# Patient Record
Sex: MALE | Race: WHITE | Employment: OTHER | URBAN - METROPOLITAN AREA
[De-identification: names, ages, dates, MRNs, and addresses within clinical notes are randomized per-mention and may not be internally consistent; named-entity substitution may affect disease eponyms.]

---

## 2022-08-16 ENCOUNTER — HOSPITAL ENCOUNTER (OUTPATIENT)
Dept: OCCUPATIONAL THERAPY | Age: 67
Setting detail: THERAPIES SERIES
Discharge: HOME | End: 2022-08-16
Payer: MEDICARE

## 2022-08-16 PROCEDURE — 97165 OT EVAL LOW COMPLEX 30 MIN: CPT

## 2022-08-16 PROCEDURE — 97537 COMMUNITY/WORK REINTEGRATION: CPT
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
## 2022-08-16 NOTE — PROGRESS NOTES
8/16/2022    Dear Dr. Vielka Cunningham (MR# [id number]) was seen by Occupational Therapy on 8/16/2022 for a s Evaluation at [facility].  As you know, Mr. Yuliet Salas suffers from dementia. He wants to maintain driving to be independent in community mobility and leisure skills. Mr. Yuliet Salas passed tests for visual acuity, saccades, pursuits, depth perception, peripheral vision, color vision, and traffic signs and signals. He was administered the Trails Making Tests Part A and B which are cognitive tests that involve scanning, speed of mental processing, visual motor sequencing, as well as switching cognitive sets. On Part A, he scored below normal limits with 61.60 seconds over a norm of 60 seconds and on Part B, he was stopped at 300 seconds with a norm of 180 seconds. He was administered the Clock Drawing Test which measured short-term memory, visual perception, visuospatial skills, selective attention, and executive skills. He had 0 errors which is within normal limits. He demonstrated functional physical capacity for driving. Behind the wheel, Mr. Yuliet Salas was able to manipulate all vehicle controls. He drove on secondary and primary roads in light to moderately heavy traffic. He demonstrated the ability to park, back up, maintain speed and traffic flow, change lanes and follow directions. Responses to situations encountered were appropriate. His wife reports he has made wrongs turns when driving alone and has gotten lost.  Pt reports he has stopped to get directions when lost.  His wife has installed a tracking neha on his phone and also has a GPS tracker on his keys. Based on reports of getting lost when driving and also decrease short term memory it was recommended he drive on local, familiar roads within four miles of his home with no expressway driving.     Based on the results of this evaluation, it appears that Mr. Yuliet Salas is a suitable candidate to maintain driving on local, familiar roads within four miles of his home with no expressway driving. He is as safe as the driving public. The final decision remains with the physician. Please inform Mr. Josseline Hernandez of your decision. I can be reached at [phone] if questions arise.   Thank you for this referral.    Sincerely,      Luis Brewster, [address] [facility], [facility]   Certified  Rehabilitation Specialist

## 2022-08-16 NOTE — PROGRESS NOTES
Outpatient Occupational Therapy  [] [facility] [address] ARELY BANUELOS   Phone: [phone]   Fax: [phone]   [x] [facility]  Phone: [phone]   Fax: [phone]  [] [place]   Phone: [phone]   Fax: [phone]      To:  Dr. Shaji Nichols      Patient: Axel Paul  : 1955  MRN: [id number]  Evaluation Date: 2022      Diagnosis Information: Dementia            Occupational Therapy Certification/Re-Certification Form  Dear Dr. Shaji Nichols,   The following patient has been evaluated for occupational therapy services and for therapy to continue, Medicare requires monthly physician review of the treatment plan. Please review the attached evaluation and/or summary of the patient's plan of care, and verify that you agree therapy should continue by signing the attached document and sending it back to our office. Plan of Care/Treatment to date:  [] Therapeutic Exercise   [] Modalities:  [] Therapeutic Activity    [] Ultrasound  [] Electrical Stimulation   [] Activities of Daily Living    [] Fluidotherapy [] Kinesiotaping  [] Neuromuscular Re-education   [] Iontophoresis [] Coldpack/hotpack   [] Instruction in HEP     [] Contrast Bath  [] Manual Therapy     Other:  [] Aquatic Therapy      [x] Maintain driving on local, familiar roads within four miles                                                                                     of his home with no expressway driving. Frequency/Duration:  # Days per week: [x] 1 day # Weeks: [x] 1 week [] 5 weeks      [] 2 days   [] 2 weeks [] 6 weeks     [] 3 days   [] 3 weeks [] 7 weeks     [] 4 days   [] 4 weeks [] 8 weeks    Rehab Potential: [] excellent [x] good [] fair  [] poor       Electronically signed by:  Harpreet Abreu, TOD, LDI      If you have any questions or concerns, please don't hesitate to call.   Thank you for your referral.      Physician Signature:________________________________Date:__________________  By signing above, therapists plan is approved by physician

## 2023-11-20 ENCOUNTER — HOSPITAL ENCOUNTER (OUTPATIENT)
Dept: OCCUPATIONAL THERAPY | Age: 68
Setting detail: THERAPIES SERIES
Discharge: HOME | End: 2023-11-20
Payer: MEDICARE

## 2023-11-20 PROCEDURE — 97537 COMMUNITY/WORK REINTEGRATION: CPT

## 2023-11-20 PROCEDURE — 97165 OT EVAL LOW COMPLEX 30 MIN: CPT

## 2024-07-18 ENCOUNTER — APPOINTMENT (OUTPATIENT)
Dept: GENERAL RADIOLOGY | Age: 69
End: 2024-07-18
Payer: MEDICARE

## 2024-07-18 ENCOUNTER — HOSPITAL ENCOUNTER (EMERGENCY)
Age: 69
Discharge: HOME | End: 2024-07-18
Payer: MEDICARE

## 2024-07-18 ENCOUNTER — APPOINTMENT (OUTPATIENT)
Dept: CT IMAGING | Age: 69
End: 2024-07-18
Payer: MEDICARE

## 2024-07-18 VITALS
WEIGHT: 220.46 LBS | HEART RATE: 81 BPM | TEMPERATURE: 98.6 F | DIASTOLIC BLOOD PRESSURE: 78 MMHG | SYSTOLIC BLOOD PRESSURE: 138 MMHG | RESPIRATION RATE: 18 BRPM | OXYGEN SATURATION: 100 %

## 2024-07-18 DIAGNOSIS — N39.0 URINARY TRACT INFECTION WITH HEMATURIA, SITE UNSPECIFIED: Primary | ICD-10-CM

## 2024-07-18 DIAGNOSIS — F02.80 ALZHEIMER DISEASE (HCC): ICD-10-CM

## 2024-07-18 DIAGNOSIS — G30.9 ALZHEIMER DISEASE (HCC): ICD-10-CM

## 2024-07-18 DIAGNOSIS — R31.9 URINARY TRACT INFECTION WITH HEMATURIA, SITE UNSPECIFIED: Primary | ICD-10-CM

## 2024-07-18 PROBLEM — G47.33 OSA (OBSTRUCTIVE SLEEP APNEA): Status: ACTIVE | Noted: 2022-10-04

## 2024-07-18 PROBLEM — E03.8 SUBCLINICAL HYPOTHYROIDISM: Status: ACTIVE | Noted: 2023-03-20

## 2024-07-18 PROBLEM — R73.03 PREDIABETES: Chronic | Status: ACTIVE | Noted: 2022-10-25

## 2024-07-18 PROBLEM — F33.0 MILD EPISODE OF RECURRENT MAJOR DEPRESSIVE DISORDER (HCC): Status: ACTIVE | Noted: 2022-06-28

## 2024-07-18 PROBLEM — E78.1 HYPERTRIGLYCERIDEMIA: Chronic | Status: ACTIVE | Noted: 2022-10-25

## 2024-07-18 PROBLEM — G25.2 POSTURAL TREMOR: Status: ACTIVE | Noted: 2022-06-28

## 2024-07-18 PROBLEM — E55.9 VITAMIN D DEFICIENCY: Status: ACTIVE | Noted: 2017-09-27

## 2024-07-18 PROBLEM — E53.8 B12 DEFICIENCY: Status: ACTIVE | Noted: 2024-04-02

## 2024-07-18 PROBLEM — R41.3 POOR SHORT-TERM MEMORY: Status: ACTIVE | Noted: 2017-10-22

## 2024-07-18 PROBLEM — R97.20 ELEVATED PSA, LESS THAN 10 NG/ML: Status: ACTIVE | Noted: 2023-04-07

## 2024-07-18 LAB
ALBUMIN SERPL-MCNC: 4.8 G/DL (ref 3.4–5)
ALBUMIN/GLOB SERPL: 1.6 {RATIO} (ref 1.1–2.2)
ALP SERPL-CCNC: 63 U/L (ref 40–129)
ALT SERPL-CCNC: 18 U/L (ref 10–40)
AMMONIA PLAS-SCNC: 29 UMOL/L (ref 16–60)
AMPHETAMINES UR QL SCN>1000 NG/ML: NORMAL
ANION GAP SERPL CALCULATED.3IONS-SCNC: 13 MMOL/L (ref 3–16)
AST SERPL-CCNC: 21 U/L (ref 15–37)
BACTERIA URNS QL MICRO: ABNORMAL /HPF
BARBITURATES UR QL SCN>200 NG/ML: NORMAL
BASOPHILS # BLD: 0.1 K/UL (ref 0–0.2)
BASOPHILS NFR BLD: 0.4 %
BENZODIAZ UR QL SCN>200 NG/ML: NORMAL
BILIRUB SERPL-MCNC: 0.3 MG/DL (ref 0–1)
BILIRUB UR QL STRIP.AUTO: ABNORMAL
BUN SERPL-MCNC: 18 MG/DL (ref 7–20)
CALCIUM OXALATE CRYSTALS: PRESENT
CALCIUM SERPL-MCNC: 9.8 MG/DL (ref 8.3–10.6)
CANNABINOIDS UR QL SCN>50 NG/ML: NORMAL
CHLORIDE SERPL-SCNC: 105 MMOL/L (ref 99–110)
CLARITY UR: ABNORMAL
CO2 SERPL-SCNC: 23 MMOL/L (ref 21–32)
COCAINE UR QL SCN: NORMAL
COLOR UR: ABNORMAL
CREAT SERPL-MCNC: 1.6 MG/DL (ref 0.8–1.3)
DEPRECATED RDW RBC AUTO: 14 % (ref 12.4–15.4)
DRUG SCREEN COMMENT UR-IMP: NORMAL
EOSINOPHIL # BLD: 0.1 K/UL (ref 0–0.6)
EOSINOPHIL NFR BLD: 0.8 %
EPI CELLS #/AREA URNS AUTO: 5 /HPF (ref 0–5)
ETHANOLAMINE SERPL-MCNC: NORMAL MG/DL (ref 0–0.08)
ETHANOLAMINE SERPL-MCNC: NORMAL MG/DL (ref 0–0.08)
FENTANYL SCREEN, URINE: NORMAL
GFR SERPLBLD CREATININE-BSD FMLA CKD-EPI: 46 ML/MIN/{1.73_M2}
GLUCOSE SERPL-MCNC: 162 MG/DL (ref 70–99)
GLUCOSE UR STRIP.AUTO-MCNC: 100 MG/DL
HCT VFR BLD AUTO: 44.8 % (ref 40.5–52.5)
HGB BLD-MCNC: 15.1 G/DL (ref 13.5–17.5)
HGB UR QL STRIP.AUTO: ABNORMAL
HYALINE CASTS #/AREA URNS AUTO: 31 /LPF (ref 0–8)
HYALINE CASTS: PRESENT
KETONES UR STRIP.AUTO-MCNC: ABNORMAL MG/DL
LEUKOCYTE ESTERASE UR QL STRIP.AUTO: NEGATIVE
LYMPHOCYTES # BLD: 2.2 K/UL (ref 1–5.1)
LYMPHOCYTES NFR BLD: 14.2 %
MCH RBC QN AUTO: 29.9 PG (ref 26–34)
MCHC RBC AUTO-ENTMCNC: 33.6 G/DL (ref 31–36)
MCV RBC AUTO: 88.9 FL (ref 80–100)
METHADONE UR QL SCN>300 NG/ML: NORMAL
MONOCYTES # BLD: 0.8 K/UL (ref 0–1.3)
MONOCYTES NFR BLD: 4.8 %
NEUTROPHILS # BLD: 12.6 K/UL (ref 1.7–7.7)
NEUTROPHILS NFR BLD: 79.8 %
NITRITE UR QL STRIP.AUTO: NEGATIVE
OPIATES UR QL SCN>300 NG/ML: NORMAL
OXYCODONE UR QL SCN: NORMAL
PCP UR QL SCN>25 NG/ML: NORMAL
PH UR STRIP.AUTO: 5.5 [PH] (ref 5–8)
PH UR STRIP: 5 [PH]
PLATELET # BLD AUTO: 271 K/UL (ref 135–450)
PMV BLD AUTO: 8.9 FL (ref 5–10.5)
POTASSIUM SERPL-SCNC: 4.1 MMOL/L (ref 3.5–5.1)
PROT SERPL-MCNC: 7.8 G/DL (ref 6.4–8.2)
PROT UR STRIP.AUTO-MCNC: 300 MG/DL
RBC # BLD AUTO: 5.04 M/UL (ref 4.2–5.9)
RBC CLUMPS #/AREA URNS AUTO: 43 /HPF (ref 0–4)
SODIUM SERPL-SCNC: 141 MMOL/L (ref 136–145)
SP GR UR STRIP.AUTO: 1.03 (ref 1–1.03)
TSH SERPL DL<=0.005 MIU/L-ACNC: 5.2 UIU/ML (ref 0.27–4.2)
UA COMPLETE W REFLEX CULTURE PNL UR: YES
UA DIPSTICK W REFLEX MICRO PNL UR: YES
URN SPEC COLLECT METH UR: ABNORMAL
UROBILINOGEN UR STRIP-ACNC: 1 E.U./DL
WBC # BLD AUTO: 15.7 K/UL (ref 4–11)
WBC #/AREA URNS AUTO: 11 /HPF (ref 0–5)

## 2024-07-18 PROCEDURE — 81001 URINALYSIS AUTO W/SCOPE: CPT

## 2024-07-18 PROCEDURE — 80307 DRUG TEST PRSMV CHEM ANLYZR: CPT

## 2024-07-18 PROCEDURE — 85025 COMPLETE CBC W/AUTO DIFF WBC: CPT

## 2024-07-18 PROCEDURE — 93005 ELECTROCARDIOGRAM TRACING: CPT | Performed by: STUDENT IN AN ORGANIZED HEALTH CARE EDUCATION/TRAINING PROGRAM

## 2024-07-18 PROCEDURE — 84443 ASSAY THYROID STIM HORMONE: CPT

## 2024-07-18 PROCEDURE — 99285 EMERGENCY DEPT VISIT HI MDM: CPT

## 2024-07-18 PROCEDURE — 87086 URINE CULTURE/COLONY COUNT: CPT

## 2024-07-18 PROCEDURE — 82077 ASSAY SPEC XCP UR&BREATH IA: CPT

## 2024-07-18 PROCEDURE — 71046 X-RAY EXAM CHEST 2 VIEWS: CPT

## 2024-07-18 PROCEDURE — 6370000000 HC RX 637 (ALT 250 FOR IP): Performed by: PHYSICIAN ASSISTANT

## 2024-07-18 PROCEDURE — 80053 COMPREHEN METABOLIC PANEL: CPT

## 2024-07-18 PROCEDURE — 70450 CT HEAD/BRAIN W/O DYE: CPT

## 2024-07-18 PROCEDURE — 82140 ASSAY OF AMMONIA: CPT

## 2024-07-18 RX ORDER — CEPHALEXIN 500 MG/1
500 CAPSULE ORAL 2 TIMES DAILY
Qty: 14 CAPSULE | Refills: 0 | Status: SHIPPED | OUTPATIENT
Start: 2024-07-18 | End: 2024-07-25

## 2024-07-18 RX ORDER — CEPHALEXIN 500 MG/1
500 CAPSULE ORAL ONCE
Status: COMPLETED | OUTPATIENT
Start: 2024-07-18 | End: 2024-07-18

## 2024-07-18 RX ADMIN — CEPHALEXIN 500 MG: 500 CAPSULE ORAL at 20:47

## 2024-07-18 ASSESSMENT — LIFESTYLE VARIABLES
HOW OFTEN DO YOU HAVE A DRINK CONTAINING ALCOHOL: NEVER
HOW MANY STANDARD DRINKS CONTAINING ALCOHOL DO YOU HAVE ON A TYPICAL DAY: PATIENT DOES NOT DRINK

## 2024-07-18 ASSESSMENT — PAIN - FUNCTIONAL ASSESSMENT: PAIN_FUNCTIONAL_ASSESSMENT: NONE - DENIES PAIN

## 2024-07-18 NOTE — ED PROVIDER NOTES
[facility] EMERGENCY DEPARTMENT  EMERGENCY DEPARTMENT ENCOUNTER        Pt Name: Spencer Ingram  MRN: [id number]  Birthdate 1955  Date of evaluation: 7/18/2024  Provider: DEANNA Lyon  PCP: Doroteo Jha MD  Note Started: 5:51 PM EDT 7/18/24      CHON. I have evaluated this patient.        CHIEF COMPLAINT       Chief Complaint   Patient presents with    Altered Mental Status     Pt to ED via [facility] c/o AMS. Per EMS \"Pt was found wondering in the woods and was found altered\" Pt is A&O x2       HISTORY OF PRESENT ILLNESS: 1 or more Elements     History from : Patient and EMS    Limitations to history : None    Spencer Ingram is a 68 y.o. male who presents to the emergency room due to altered mental status as reported by EMS.  EMS stated that they noticed the patient wandering around in the woods and was found to be altered.  Review of patient's chart showed that he does have Alzheimer's dementia.  EMS also state that they have encountered this patient before wandering outside similar presentation.  He also states that he had vomit on his shirt when he first saw him.  Patient is alert to self and to where he is at the hospital and knows what year it is.  He does not remember throwing up on himself.  He states that he was walking up to a field to go to work.  He denies any pain, headache, vision changes, chest pain, shortness of breath or difficulty breathing.  Nursing staff state that they tried to call family members that were listed in his chart but they did not answer yet.    Nursing Notes were all reviewed and agreed with or any disagreements were addressed in the HPI.    REVIEW OF SYSTEMS :      Review of Systems   Unable to perform ROS: Dementia       Positives and Pertinent negatives as per HPI.     SURGICAL HISTORY   No past surgical history on file.    CURRENTMEDICATIONS       Discharge Medication List as of 7/18/2024  8:29 PM          ALLERGIES     Patient has no known

## 2024-07-19 LAB
BACTERIA UR CULT: NORMAL
EKG ATRIAL RATE: 87 BPM
EKG DIAGNOSIS: NORMAL
EKG P AXIS: 48 DEGREES
EKG P-R INTERVAL: 186 MS
EKG Q-T INTERVAL: 342 MS
EKG QRS DURATION: 92 MS
EKG QTC CALCULATION (BAZETT): 411 MS
EKG R AXIS: 16 DEGREES
EKG T AXIS: 31 DEGREES
EKG VENTRICULAR RATE: 87 BPM

## 2024-07-19 PROCEDURE — 93010 ELECTROCARDIOGRAM REPORT: CPT | Performed by: INTERNAL MEDICINE

## 2024-07-19 NOTE — ED PROVIDER NOTES
I did not perform an evaluation of Spencer Ingram but was asked to review his EKG.     EKG interpreted by myself.   Rate: normal  Rhythm: NSR  Axis: normal  Intervals: within normal limits  ST Segments: no acute abnormality  T waves: no acute abnormality  Comparison: No prior EKG on file.  Impression: NSR with no acute abnormality         Riley Marquez MD  07/18/24 2052

## 2024-07-19 NOTE — ED NOTES

## 2025-03-22 ENCOUNTER — HOSPITAL ENCOUNTER (OUTPATIENT)
Age: 70
Setting detail: OBSERVATION
Discharge: PSYCHIATRIC OR SUBSTANCE USE FACILITY | End: 2025-03-24
Attending: EMERGENCY MEDICINE | Admitting: INTERNAL MEDICINE
Payer: MEDICARE

## 2025-03-22 DIAGNOSIS — R41.82 ALTERED MENTAL STATUS, UNSPECIFIED ALTERED MENTAL STATUS TYPE: Primary | ICD-10-CM

## 2025-03-22 PROCEDURE — 99285 EMERGENCY DEPT VISIT HI MDM: CPT

## 2025-03-22 ASSESSMENT — PAIN - FUNCTIONAL ASSESSMENT: PAIN_FUNCTIONAL_ASSESSMENT: NONE - DENIES PAIN

## 2025-03-23 ENCOUNTER — APPOINTMENT (OUTPATIENT)
Dept: CT IMAGING | Age: 70
End: 2025-03-23
Payer: MEDICARE

## 2025-03-23 PROBLEM — F03.918 DEMENTIA WITH BEHAVIORAL DISTURBANCE (HCC): Status: ACTIVE | Noted: 2025-03-23

## 2025-03-23 LAB
ALBUMIN SERPL-MCNC: 4.1 G/DL (ref 3.4–5)
ALBUMIN/GLOB SERPL: 1.7 {RATIO} (ref 1.1–2.2)
ALP SERPL-CCNC: 52 U/L (ref 40–129)
ALT SERPL-CCNC: 13 U/L (ref 10–40)
AMPHETAMINES UR QL SCN>1000 NG/ML: NORMAL
ANION GAP SERPL CALCULATED.3IONS-SCNC: 9 MMOL/L (ref 3–16)
APAP SERPL-MCNC: <5 UG/ML (ref 10–30)
AST SERPL-CCNC: 18 U/L (ref 15–37)
BARBITURATES UR QL SCN>200 NG/ML: NORMAL
BASOPHILS # BLD: 0 K/UL (ref 0–0.2)
BASOPHILS NFR BLD: 0.4 %
BENZODIAZ UR QL SCN>200 NG/ML: NORMAL
BILIRUB SERPL-MCNC: 0.3 MG/DL (ref 0–1)
BILIRUB UR QL STRIP.AUTO: NEGATIVE
BUN SERPL-MCNC: 15 MG/DL (ref 7–20)
CALCIUM SERPL-MCNC: 9.2 MG/DL (ref 8.3–10.6)
CANNABINOIDS UR QL SCN>50 NG/ML: NORMAL
CHLORIDE SERPL-SCNC: 106 MMOL/L (ref 99–110)
CLARITY UR: CLEAR
CO2 SERPL-SCNC: 24 MMOL/L (ref 21–32)
COCAINE UR QL SCN: NORMAL
COLOR UR: YELLOW
CREAT SERPL-MCNC: 1 MG/DL (ref 0.8–1.3)
DEPRECATED RDW RBC AUTO: 14 % (ref 12.4–15.4)
DRUG SCREEN COMMENT UR-IMP: NORMAL
EOSINOPHIL # BLD: 0.2 K/UL (ref 0–0.6)
EOSINOPHIL NFR BLD: 1.9 %
ETHANOLAMINE SERPL-MCNC: NORMAL MG/DL (ref 0–0.08)
FENTANYL SCREEN, URINE: NORMAL
FOLATE SERPL-MCNC: 10.1 NG/ML (ref 4.78–24.2)
GFR SERPLBLD CREATININE-BSD FMLA CKD-EPI: 81 ML/MIN/{1.73_M2}
GLUCOSE SERPL-MCNC: 99 MG/DL (ref 70–99)
GLUCOSE UR STRIP.AUTO-MCNC: NEGATIVE MG/DL
HCT VFR BLD AUTO: 39.4 % (ref 40.5–52.5)
HGB BLD-MCNC: 13.1 G/DL (ref 13.5–17.5)
HGB UR QL STRIP.AUTO: NEGATIVE
KETONES UR STRIP.AUTO-MCNC: NEGATIVE MG/DL
LEUKOCYTE ESTERASE UR QL STRIP.AUTO: NEGATIVE
LYMPHOCYTES # BLD: 2.7 K/UL (ref 1–5.1)
LYMPHOCYTES NFR BLD: 34.2 %
MCH RBC QN AUTO: 29.7 PG (ref 26–34)
MCHC RBC AUTO-ENTMCNC: 33.3 G/DL (ref 31–36)
MCV RBC AUTO: 89 FL (ref 80–100)
METHADONE UR QL SCN>300 NG/ML: NORMAL
MONOCYTES # BLD: 0.6 K/UL (ref 0–1.3)
MONOCYTES NFR BLD: 7 %
NEUTROPHILS # BLD: 4.5 K/UL (ref 1.7–7.7)
NEUTROPHILS NFR BLD: 56.5 %
NITRITE UR QL STRIP.AUTO: NEGATIVE
NT-PROBNP SERPL-MCNC: 202 PG/ML (ref 0–124)
OPIATES UR QL SCN>300 NG/ML: NORMAL
OXYCODONE UR QL SCN: NORMAL
PCP UR QL SCN>25 NG/ML: NORMAL
PH UR STRIP.AUTO: 6.5 [PH] (ref 5–8)
PH UR STRIP: 5.5 [PH]
PLATELET # BLD AUTO: 203 K/UL (ref 135–450)
PMV BLD AUTO: 9 FL (ref 5–10.5)
POTASSIUM SERPL-SCNC: 3.6 MMOL/L (ref 3.5–5.1)
PROT SERPL-MCNC: 6.5 G/DL (ref 6.4–8.2)
PROT UR STRIP.AUTO-MCNC: NEGATIVE MG/DL
RBC # BLD AUTO: 4.42 M/UL (ref 4.2–5.9)
SALICYLATES SERPL-MCNC: <0.5 MG/DL (ref 15–30)
SODIUM SERPL-SCNC: 139 MMOL/L (ref 136–145)
SP GR UR STRIP.AUTO: 1.02 (ref 1–1.03)
TROPONIN, HIGH SENSITIVITY: 9 NG/L (ref 0–22)
TSH SERPL DL<=0.005 MIU/L-ACNC: 3.09 UIU/ML (ref 0.27–4.2)
UA COMPLETE W REFLEX CULTURE PNL UR: NORMAL
UA DIPSTICK W REFLEX MICRO PNL UR: NORMAL
URN SPEC COLLECT METH UR: NORMAL
UROBILINOGEN UR STRIP-ACNC: 1 E.U./DL
VIT B12 SERPL-MCNC: 745 PG/ML (ref 211–911)
WBC # BLD AUTO: 7.9 K/UL (ref 4–11)

## 2025-03-23 PROCEDURE — 6370000000 HC RX 637 (ALT 250 FOR IP): Performed by: FAMILY MEDICINE

## 2025-03-23 PROCEDURE — 84484 ASSAY OF TROPONIN QUANT: CPT

## 2025-03-23 PROCEDURE — 2500000003 HC RX 250 WO HCPCS

## 2025-03-23 PROCEDURE — 80143 DRUG ASSAY ACETAMINOPHEN: CPT

## 2025-03-23 PROCEDURE — P9612 CATHETERIZE FOR URINE SPEC: HCPCS

## 2025-03-23 PROCEDURE — 2500000003 HC RX 250 WO HCPCS: Performed by: INTERNAL MEDICINE

## 2025-03-23 PROCEDURE — 6360000002 HC RX W HCPCS: Performed by: FAMILY MEDICINE

## 2025-03-23 PROCEDURE — 80179 DRUG ASSAY SALICYLATE: CPT

## 2025-03-23 PROCEDURE — 84443 ASSAY THYROID STIM HORMONE: CPT

## 2025-03-23 PROCEDURE — 94760 N-INVAS EAR/PLS OXIMETRY 1: CPT

## 2025-03-23 PROCEDURE — 82607 VITAMIN B-12: CPT

## 2025-03-23 PROCEDURE — 82746 ASSAY OF FOLIC ACID SERUM: CPT

## 2025-03-23 PROCEDURE — 6360000002 HC RX W HCPCS: Performed by: INTERNAL MEDICINE

## 2025-03-23 PROCEDURE — 85025 COMPLETE CBC W/AUTO DIFF WBC: CPT

## 2025-03-23 PROCEDURE — 96372 THER/PROPH/DIAG INJ SC/IM: CPT

## 2025-03-23 PROCEDURE — 80307 DRUG TEST PRSMV CHEM ANLYZR: CPT

## 2025-03-23 PROCEDURE — 80053 COMPREHEN METABOLIC PANEL: CPT

## 2025-03-23 PROCEDURE — G0378 HOSPITAL OBSERVATION PER HR: HCPCS

## 2025-03-23 PROCEDURE — 6370000000 HC RX 637 (ALT 250 FOR IP): Performed by: INTERNAL MEDICINE

## 2025-03-23 PROCEDURE — 81003 URINALYSIS AUTO W/O SCOPE: CPT

## 2025-03-23 PROCEDURE — 87635 SARS-COV-2 COVID-19 AMP PRB: CPT

## 2025-03-23 PROCEDURE — 82077 ASSAY SPEC XCP UR&BREATH IA: CPT

## 2025-03-23 PROCEDURE — 96374 THER/PROPH/DIAG INJ IV PUSH: CPT

## 2025-03-23 PROCEDURE — 83880 ASSAY OF NATRIURETIC PEPTIDE: CPT

## 2025-03-23 PROCEDURE — 6370000000 HC RX 637 (ALT 250 FOR IP): Performed by: PHYSICIAN ASSISTANT

## 2025-03-23 PROCEDURE — 36415 COLL VENOUS BLD VENIPUNCTURE: CPT

## 2025-03-23 PROCEDURE — 70450 CT HEAD/BRAIN W/O DYE: CPT

## 2025-03-23 RX ORDER — DIPHENHYDRAMINE HYDROCHLORIDE 50 MG/ML
25 INJECTION, SOLUTION INTRAMUSCULAR; INTRAVENOUS EVERY 6 HOURS PRN
Status: DISCONTINUED | OUTPATIENT
Start: 2025-03-23 | End: 2025-03-24 | Stop reason: HOSPADM

## 2025-03-23 RX ORDER — ACETAMINOPHEN 325 MG/1
650 TABLET ORAL EVERY 6 HOURS PRN
Status: DISCONTINUED | OUTPATIENT
Start: 2025-03-23 | End: 2025-03-24 | Stop reason: HOSPADM

## 2025-03-23 RX ORDER — ATORVASTATIN CALCIUM 20 MG/1
20 TABLET, FILM COATED ORAL DAILY
Status: ON HOLD | COMMUNITY
Start: 2023-10-18

## 2025-03-23 RX ORDER — WATER 10 ML/10ML
INJECTION INTRAMUSCULAR; INTRAVENOUS; SUBCUTANEOUS
Status: COMPLETED
Start: 2025-03-23 | End: 2025-03-23

## 2025-03-23 RX ORDER — ONDANSETRON 2 MG/ML
4 INJECTION INTRAMUSCULAR; INTRAVENOUS EVERY 6 HOURS PRN
Status: DISCONTINUED | OUTPATIENT
Start: 2025-03-23 | End: 2025-03-24 | Stop reason: HOSPADM

## 2025-03-23 RX ORDER — DONEPEZIL HYDROCHLORIDE 10 MG/1
10 TABLET, FILM COATED ORAL
Status: DISCONTINUED | OUTPATIENT
Start: 2025-03-24 | End: 2025-03-24 | Stop reason: HOSPADM

## 2025-03-23 RX ORDER — QUETIAPINE FUMARATE 25 MG/1
50 TABLET, FILM COATED ORAL 2 TIMES DAILY
Status: DISCONTINUED | OUTPATIENT
Start: 2025-03-23 | End: 2025-03-24 | Stop reason: HOSPADM

## 2025-03-23 RX ORDER — ATORVASTATIN CALCIUM 20 MG/1
20 TABLET, FILM COATED ORAL DAILY
Status: DISCONTINUED | OUTPATIENT
Start: 2025-03-23 | End: 2025-03-24 | Stop reason: HOSPADM

## 2025-03-23 RX ORDER — HALOPERIDOL 5 MG/ML
5 INJECTION INTRAMUSCULAR EVERY 6 HOURS PRN
Status: DISCONTINUED | OUTPATIENT
Start: 2025-03-23 | End: 2025-03-24 | Stop reason: HOSPADM

## 2025-03-23 RX ORDER — LORAZEPAM 2 MG/ML
2 INJECTION INTRAMUSCULAR ONCE
Status: COMPLETED | OUTPATIENT
Start: 2025-03-23 | End: 2025-03-23

## 2025-03-23 RX ORDER — POLYETHYLENE GLYCOL 3350 17 G/17G
17 POWDER, FOR SOLUTION ORAL DAILY PRN
Status: DISCONTINUED | OUTPATIENT
Start: 2025-03-23 | End: 2025-03-24 | Stop reason: HOSPADM

## 2025-03-23 RX ORDER — ACETAMINOPHEN 650 MG/1
650 SUPPOSITORY RECTAL EVERY 6 HOURS PRN
Status: DISCONTINUED | OUTPATIENT
Start: 2025-03-23 | End: 2025-03-24 | Stop reason: HOSPADM

## 2025-03-23 RX ORDER — MAGNESIUM HYDROXIDE/ALUMINUM HYDROXICE/SIMETHICONE 120; 1200; 1200 MG/30ML; MG/30ML; MG/30ML
30 SUSPENSION ORAL EVERY 6 HOURS PRN
Status: DISCONTINUED | OUTPATIENT
Start: 2025-03-23 | End: 2025-03-24 | Stop reason: HOSPADM

## 2025-03-23 RX ORDER — ONDANSETRON 4 MG/1
4 TABLET, ORALLY DISINTEGRATING ORAL EVERY 8 HOURS PRN
Status: DISCONTINUED | OUTPATIENT
Start: 2025-03-23 | End: 2025-03-24 | Stop reason: HOSPADM

## 2025-03-23 RX ORDER — ENOXAPARIN SODIUM 100 MG/ML
40 INJECTION SUBCUTANEOUS DAILY
Status: DISCONTINUED | OUTPATIENT
Start: 2025-03-23 | End: 2025-03-24 | Stop reason: HOSPADM

## 2025-03-23 RX ORDER — OLANZAPINE 10 MG/2ML
5 INJECTION, POWDER, FOR SOLUTION INTRAMUSCULAR ONCE
Status: COMPLETED | OUTPATIENT
Start: 2025-03-23 | End: 2025-03-23

## 2025-03-23 RX ORDER — MAGNESIUM SULFATE IN WATER 40 MG/ML
2000 INJECTION, SOLUTION INTRAVENOUS PRN
Status: DISCONTINUED | OUTPATIENT
Start: 2025-03-23 | End: 2025-03-24 | Stop reason: HOSPADM

## 2025-03-23 RX ORDER — DONEPEZIL HYDROCHLORIDE 10 MG/1
10 TABLET, FILM COATED ORAL
Status: ON HOLD | COMMUNITY
Start: 2023-10-23

## 2025-03-23 RX ORDER — OLANZAPINE 5 MG/1
10 TABLET, ORALLY DISINTEGRATING ORAL ONCE
Status: COMPLETED | OUTPATIENT
Start: 2025-03-23 | End: 2025-03-23

## 2025-03-23 RX ORDER — SODIUM CHLORIDE 0.9 % (FLUSH) 0.9 %
5-40 SYRINGE (ML) INJECTION PRN
Status: DISCONTINUED | OUTPATIENT
Start: 2025-03-23 | End: 2025-03-24 | Stop reason: HOSPADM

## 2025-03-23 RX ORDER — LORAZEPAM 2 MG/ML
1 INJECTION INTRAMUSCULAR EVERY 6 HOURS PRN
Status: DISCONTINUED | OUTPATIENT
Start: 2025-03-23 | End: 2025-03-24 | Stop reason: HOSPADM

## 2025-03-23 RX ORDER — SODIUM CHLORIDE 9 MG/ML
INJECTION, SOLUTION INTRAVENOUS PRN
Status: DISCONTINUED | OUTPATIENT
Start: 2025-03-23 | End: 2025-03-24 | Stop reason: HOSPADM

## 2025-03-23 RX ORDER — OLANZAPINE 10 MG/2ML
10 INJECTION, POWDER, FOR SOLUTION INTRAMUSCULAR EVERY 6 HOURS PRN
Status: DISCONTINUED | OUTPATIENT
Start: 2025-03-23 | End: 2025-03-24 | Stop reason: HOSPADM

## 2025-03-23 RX ORDER — SERTRALINE HYDROCHLORIDE 100 MG/1
150 TABLET, FILM COATED ORAL DAILY
Status: ON HOLD | COMMUNITY
Start: 2025-03-06

## 2025-03-23 RX ORDER — POTASSIUM CHLORIDE 1500 MG/1
40 TABLET, EXTENDED RELEASE ORAL PRN
Status: DISCONTINUED | OUTPATIENT
Start: 2025-03-23 | End: 2025-03-24 | Stop reason: HOSPADM

## 2025-03-23 RX ORDER — SODIUM CHLORIDE 0.9 % (FLUSH) 0.9 %
5-40 SYRINGE (ML) INJECTION EVERY 12 HOURS SCHEDULED
Status: DISCONTINUED | OUTPATIENT
Start: 2025-03-23 | End: 2025-03-24 | Stop reason: HOSPADM

## 2025-03-23 RX ORDER — FENOFIBRATE 160 MG/1
160 TABLET ORAL DAILY
Status: ON HOLD | COMMUNITY
Start: 2025-03-07

## 2025-03-23 RX ORDER — OLANZAPINE 5 MG/1
10 TABLET, ORALLY DISINTEGRATING ORAL NIGHTLY
Status: DISCONTINUED | OUTPATIENT
Start: 2025-03-23 | End: 2025-03-24 | Stop reason: HOSPADM

## 2025-03-23 RX ORDER — POTASSIUM CHLORIDE 7.45 MG/ML
10 INJECTION INTRAVENOUS PRN
Status: DISCONTINUED | OUTPATIENT
Start: 2025-03-23 | End: 2025-03-24 | Stop reason: HOSPADM

## 2025-03-23 RX ADMIN — Medication 3 MG: at 20:43

## 2025-03-23 RX ADMIN — OLANZAPINE 10 MG: 5 TABLET, ORALLY DISINTEGRATING ORAL at 20:45

## 2025-03-23 RX ADMIN — ENOXAPARIN SODIUM 40 MG: 100 INJECTION SUBCUTANEOUS at 08:34

## 2025-03-23 RX ADMIN — LORAZEPAM 2 MG: 2 INJECTION INTRAMUSCULAR; INTRAVENOUS at 14:51

## 2025-03-23 RX ADMIN — OLANZAPINE 10 MG: 5 TABLET, ORALLY DISINTEGRATING ORAL at 01:54

## 2025-03-23 RX ADMIN — SODIUM CHLORIDE, PRESERVATIVE FREE 10 ML: 5 INJECTION INTRAVENOUS at 09:44

## 2025-03-23 RX ADMIN — DIPHENHYDRAMINE HYDROCHLORIDE 25 MG: 50 INJECTION INTRAMUSCULAR; INTRAVENOUS at 22:33

## 2025-03-23 RX ADMIN — DIPHENHYDRAMINE HYDROCHLORIDE 25 MG: 50 INJECTION INTRAMUSCULAR; INTRAVENOUS at 16:14

## 2025-03-23 RX ADMIN — OLANZAPINE 5 MG: 10 INJECTION, POWDER, FOR SOLUTION INTRAMUSCULAR at 14:48

## 2025-03-23 RX ADMIN — QUETIAPINE FUMARATE 50 MG: 25 TABLET ORAL at 20:43

## 2025-03-23 RX ADMIN — SERTRALINE 150 MG: 100 TABLET, FILM COATED ORAL at 16:14

## 2025-03-23 RX ADMIN — HALOPERIDOL LACTATE 5 MG: 5 INJECTION, SOLUTION INTRAMUSCULAR at 22:29

## 2025-03-23 RX ADMIN — ATORVASTATIN CALCIUM 20 MG: 20 TABLET, FILM COATED ORAL at 20:43

## 2025-03-23 RX ADMIN — WATER: 1 INJECTION INTRAMUSCULAR; INTRAVENOUS; SUBCUTANEOUS at 14:48

## 2025-03-23 ASSESSMENT — PAIN SCALES - GENERAL: PAINLEVEL_OUTOF10: 0

## 2025-03-23 NOTE — PROGRESS NOTES
Medication Reconciliation    List of medications patient is currently taking is complete.     Source of information: 1. Conversation with patient's family at bedside                                      2. EPIC records      Allergies  Patient has no known allergies.     Notes regarding home medications:   1. Updated list based on what patient should be taking per UC records and dispense history. Patient would be out of donepezil and atorvastatin based on fill history.  2. Per UC records patient should be on Namenda 10 mg BID, however, patient has not filled this in over 6 months so I did not add to list at this time.       Jana Medrano, [facility], PharmD, 3/23/2025 12:08 PM

## 2025-03-23 NOTE — PLAN OF CARE
Problem: Discharge Planning  Goal: Discharge to home or other facility with appropriate resources  Outcome: Progressing  Flowsheets (Taken 3/23/2025 1947)  Discharge to home or other facility with appropriate resources: Identify barriers to discharge with patient and caregiver     Problem: Pain  Goal: Verbalizes/displays adequate comfort level or baseline comfort level  Outcome: Progressing  Flowsheets (Taken 3/23/2025 1947)  Verbalizes/displays adequate comfort level or baseline comfort level: Encourage patient to monitor pain and request assistance     Problem: Safety - Adult  Goal: Free from fall injury  Outcome: Progressing  Flowsheets (Taken 3/23/2025 1947)  Free From Fall Injury: Instruct family/caregiver on patient safety

## 2025-03-23 NOTE — PROGRESS NOTES
4 Eyes Skin Assessment     NAME:  Spencer Ingram  YOB: 1955  MEDICAL RECORD NUMBER:  [id number]    The patient is being assessed for  Admission    I agree that at least one RN has performed a thorough Head to Toe Skin Assessment on the patient. ALL assessment sites listed below have been assessed.      Areas assessed by both nurses:    Head, Face, Ears, Shoulders, Back, Chest, Arms, Elbows, Hands, Sacrum. Buttock, Coccyx, Ischium, Legs. Feet and Heels, and Under Medical Devices         Does the Patient have a Wound? No noted wound(s)       García Prevention initiated by RN: Yes  Wound Care Orders initiated by RN: No    Pressure Injury (Stage 3,4, Unstageable, DTI, NWPT, and Complex wounds) if present, place Wound referral order by RN under : No    New Ostomies, if present place, Ostomy referral order under : No     Nurse 1 eSignature: Electronically signed by Mckenna Mendoza RN on 3/23/25 at 5:35 AM EDT    **SHARE this note so that the co-signing nurse can place an eSignature**    Nurse 2 eSignature: {Esignature:[id number]}

## 2025-03-23 NOTE — DISCHARGE INSTR - COC
Continuity of Care Form    Patient Name: Spencer Frye   :  1955  MRN:  [id number]    Admit date:  3/22/2025  Discharge date:  ***    Code Status Order: Full Code   Advance Directives:     Admitting Physician:  Roxy Acuña MD  PCP: Doroteo Jha MD    Discharging Nurse: ***  Discharging Hospital Unit/Room#: A7A-1600/3126-01  Discharging Unit Phone Number: ***    Emergency Contact:   Extended Emergency Contact Information  Primary Emergency Contact: BRIELLE FRYE  Home Phone: [phone]  Mobile Phone: [phone]  Relation: Spouse    Past Surgical History:  History reviewed. No pertinent surgical history.    Immunization History:   Immunization History   Administered Date(s) Administered    COVID-19, MODERNA Booster BLUE border, (age 18y+), IM, 50mcg/0.25mL 2021       Active Problems:  Patient Active Problem List   Diagnosis Code    B12 deficiency E53.8    Dementia in Alzheimer's disease (HCC) G30.9, F02.80    Elevated PSA, less than 10 ng/ml R97.20    Mild episode of recurrent major depressive disorder F33.0    Hypertriglyceridemia E78.1    NOEMI (obstructive sleep apnea) G47.33    Poor short-term memory R41.3    Postural tremor G25.2    Prediabetes R73.03    Subclinical hypothyroidism E03.8    Vitamin D deficiency E55.9    Dementia with behavioral disturbance (HCC) F03.918       Isolation/Infection:   Isolation            No Isolation          Patient Infection Status    None to display         Nurse Assessment:  Last Vital Signs: /74   Pulse 64   Temp 98.4 °F (36.9 °C) (Oral)   Resp 14   Ht 1.829 m (6')   Wt 91 kg (200 lb 9.9 oz)   SpO2 97%   BMI 27.21 kg/m²     Last documented pain score (0-10 scale): Pain Level: 0  Last Weight:   Wt Readings from Last 1 Encounters:   25 91 kg (200 lb 9.9 oz)     Mental Status:  {IP PT MENTAL STATUS:}    IV Access:  { GABO IV ACCESS:[id number]}    Nursing Mobility/ADLs:  Walking   {[facility] DME ADLs:[id number]}  Transfer  {[facility] DME
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
## 2025-03-23 NOTE — PROGRESS NOTES
Arrived to room Oriented to room and call light instructed not to get OOB without assistance States understanding
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
## 2025-03-23 NOTE — CONSULTS
Ativan 1 mg PO Q 6 hours PRN agitation, May give IM if the patient refuses PO and is deemed to be an imminent danger to self or others at the time,   Bendaryl 25 mg PO Q 6 hours PRN for agitation, May give IM if the patient refuses PO and is deemed to be an imminent danger to self or others at the time., and  recommend starting quetiapine 25-50 mg BID scheduled to help with mood disturbances/agitation/aggression  Reviewed treatment plan with patient including risks, benefits, alternatives, and side effects of medications, and any/all black box warnings. Patient verbalized understanding.  Patient had an opportunity to ask questions and address concerns. Obtained informed consent for treatment.  Medical records, labs, and diagnostic tests reviewed.   Please re-consult for any new changes or concerns   Thank you for this consult.  Discussed recommendations with Dr Cuevas at time of consult completion.    TelePsych recommendations:Inpatient psychiatric admission    Legal hold: Initiate Involuntary Hold    Please send message or call via [employer] if anything more is required.     Electronically signed by SUSY Brenner CNP on 3/23/2025 at 2:33 PM.    END OF NOTE  -------------------------

## 2025-03-23 NOTE — CARE COORDINATION
Discharge order noted. Chart reviewed. Plan is to discharge to in psych facility. [facility] to [id number] to start transfer process. Hoping to get bed at [facility].    Electronically signed by Irma Erazo RN MSN on 3/23/2025 at 4:30 PM

## 2025-03-23 NOTE — PROGRESS NOTES
0745- Call received from PCA/Sitter informing that patient has two pocket knives at bedside. Security notified.     0815- Security at bedside to retrieve knives. Documentation placed in patient's chart.     0900- Morning assessments and vital signs complete. Patient given scheduled medications as prescribed. He is alert to self, disoriented to time, place, and situation. Sitter is at bedside. He is calm and cooperative, resting comfortable.     1400- Patient noted to become uncooperative, agitated, and attempting to leave the room. Patient is able to be re-directed for a short period of time before attempting again.  notified via secure message.    1430- Patient is now pacing the room and stating, \"I am leaving now\"! Patient is not alert to situation and unable to be redirected at this time. He states that he does not remember why he is here therefore he wants to leave. He states that he will not cooperate, take any medication, sit down, get in bed, or stay in this facility. Security called to bedside. Patient continues to state that he is leaving.  notified. New orders noted. Charge nurse and house supervisor at bedside. IM zyprexa given by this writer  and IV ativan given by Katty/Charge nurse as ordered. Patient agrees to get back in bed and be cooperative. Sitter remains at bedside.    1630- Patient noted to be agitated at this time, pulling at his IV line and trying to get out of bed to leave. PRN benadryl given as prescribed. Medication is effective. Patient cleansed of incontinent episode with assistance of 3, brief change completed.
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
## 2025-03-23 NOTE — PROGRESS NOTES
Discussed this patient's care with psychiatry.  They recommend inpatient geriatric psych transfer.  Will discuss with case management.    Otherwise medically cleared for discharge to inpatient psychiatry.

## 2025-03-23 NOTE — CARE COORDINATION
DISCHARGE ORDER NOTED:   Per attending patient requiring IP [facility]. Will need completed psych notes before transfer can be initiated. Case management following.   Electronically signed by KAYLA Navarrete on 3/23/2025 at 3:50 PM  [phone]
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
## 2025-03-24 ENCOUNTER — HOSPITAL ENCOUNTER (INPATIENT)
Age: 70
LOS: 22 days | Discharge: NURSING, REHAB OR LONG-TERM CARE | DRG: 057 | End: 2025-04-15
Attending: PSYCHIATRY & NEUROLOGY | Admitting: PSYCHIATRY & NEUROLOGY
Payer: MEDICARE

## 2025-03-24 VITALS
TEMPERATURE: 98 F | OXYGEN SATURATION: 96 % | RESPIRATION RATE: 16 BRPM | HEART RATE: 52 BPM | HEIGHT: 72 IN | BODY MASS INDEX: 26.34 KG/M2 | WEIGHT: 194.45 LBS | SYSTOLIC BLOOD PRESSURE: 117 MMHG | DIASTOLIC BLOOD PRESSURE: 68 MMHG

## 2025-03-24 LAB
FLUAV + FLUBV AG NOSE IA.RAPID: NOT DETECTED
FLUAV + FLUBV AG NOSE IA.RAPID: NOT DETECTED
SARS-COV-2 RDRP RESP QL NAA+PROBE: NOT DETECTED

## 2025-03-24 PROCEDURE — 97165 OT EVAL LOW COMPLEX 30 MIN: CPT

## 2025-03-24 PROCEDURE — 99221 1ST HOSP IP/OBS SF/LOW 40: CPT

## 2025-03-24 PROCEDURE — 90792 PSYCH DIAG EVAL W/MED SRVCS: CPT

## 2025-03-24 PROCEDURE — G0378 HOSPITAL OBSERVATION PER HR: HCPCS

## 2025-03-24 PROCEDURE — 6370000000 HC RX 637 (ALT 250 FOR IP): Performed by: PSYCHIATRY & NEUROLOGY

## 2025-03-24 PROCEDURE — 87502 INFLUENZA DNA AMP PROBE: CPT

## 2025-03-24 PROCEDURE — 1240000000 HC EMOTIONAL WELLNESS R&B

## 2025-03-24 PROCEDURE — 6370000000 HC RX 637 (ALT 250 FOR IP)

## 2025-03-24 PROCEDURE — 97530 THERAPEUTIC ACTIVITIES: CPT

## 2025-03-24 RX ORDER — ATORVASTATIN CALCIUM 10 MG/1
20 TABLET, FILM COATED ORAL DAILY
Status: DISCONTINUED | OUTPATIENT
Start: 2025-03-24 | End: 2025-04-15 | Stop reason: HOSPADM

## 2025-03-24 RX ORDER — TRAZODONE HYDROCHLORIDE 50 MG/1
50 TABLET ORAL NIGHTLY PRN
Status: DISCONTINUED | OUTPATIENT
Start: 2025-03-24 | End: 2025-04-15 | Stop reason: HOSPADM

## 2025-03-24 RX ORDER — OLANZAPINE 5 MG/1
5 TABLET ORAL 3 TIMES DAILY PRN
Status: DISCONTINUED | OUTPATIENT
Start: 2025-03-24 | End: 2025-04-15 | Stop reason: HOSPADM

## 2025-03-24 RX ORDER — ACETAMINOPHEN 325 MG/1
650 TABLET ORAL EVERY 8 HOURS PRN
Status: DISCONTINUED | OUTPATIENT
Start: 2025-03-24 | End: 2025-04-15 | Stop reason: HOSPADM

## 2025-03-24 RX ORDER — HYDROXYZINE HYDROCHLORIDE 50 MG/1
50 TABLET, FILM COATED ORAL 3 TIMES DAILY PRN
Status: DISCONTINUED | OUTPATIENT
Start: 2025-03-24 | End: 2025-04-15 | Stop reason: HOSPADM

## 2025-03-24 RX ORDER — POLYETHYLENE GLYCOL 3350 17 G
2 POWDER IN PACKET (EA) ORAL
Status: DISCONTINUED | OUTPATIENT
Start: 2025-03-24 | End: 2025-04-15 | Stop reason: HOSPADM

## 2025-03-24 RX ORDER — DONEPEZIL HYDROCHLORIDE 5 MG/1
10 TABLET, FILM COATED ORAL
Status: DISCONTINUED | OUTPATIENT
Start: 2025-03-25 | End: 2025-04-15 | Stop reason: HOSPADM

## 2025-03-24 RX ORDER — QUETIAPINE FUMARATE 25 MG/1
50 TABLET, FILM COATED ORAL 2 TIMES DAILY
Status: DISCONTINUED | OUTPATIENT
Start: 2025-03-24 | End: 2025-03-25

## 2025-03-24 RX ADMIN — QUETIAPINE FUMARATE 50 MG: 25 TABLET ORAL at 12:50

## 2025-03-24 RX ADMIN — TRAZODONE HYDROCHLORIDE 50 MG: 50 TABLET ORAL at 21:26

## 2025-03-24 RX ADMIN — SERTRALINE 150 MG: 100 TABLET, FILM COATED ORAL at 12:50

## 2025-03-24 RX ADMIN — QUETIAPINE FUMARATE 50 MG: 25 TABLET ORAL at 20:22

## 2025-03-24 RX ADMIN — ATORVASTATIN CALCIUM 20 MG: 10 TABLET, FILM COATED ORAL at 12:49

## 2025-03-24 SDOH — ECONOMIC STABILITY: FOOD INSECURITY: WITHIN THE PAST 12 MONTHS, THE FOOD YOU BOUGHT JUST DIDN'T LAST AND YOU DIDN'T HAVE MONEY TO GET MORE.: NEVER TRUE

## 2025-03-24 SDOH — SOCIAL STABILITY: SOCIAL NETWORK
DO YOU BELONG TO ANY CLUBS OR ORGANIZATIONS SUCH AS CHURCH GROUPS, UNIONS, FRATERNAL OR ATHLETIC GROUPS, OR SCHOOL GROUPS?: PATIENT UNABLE TO ANSWER

## 2025-03-24 SDOH — SOCIAL STABILITY: SOCIAL NETWORK: IN A TYPICAL WEEK, HOW MANY TIMES DO YOU TALK ON THE PHONE WITH FAMILY, FRIENDS, OR NEIGHBORS?: PATIENT UNABLE TO ANSWER

## 2025-03-24 SDOH — SOCIAL STABILITY: SOCIAL NETWORK: HOW OFTEN DO YOU ATTEND MEETINGS OF THE CLUBS OR ORGANIZATIONS YOU BELONG TO?: PATIENT UNABLE TO ANSWER

## 2025-03-24 SDOH — HEALTH STABILITY: MENTAL HEALTH: HOW OFTEN DO YOU HAVE A DRINK CONTAINING ALCOHOL?: NEVER

## 2025-03-24 SDOH — SOCIAL STABILITY: SOCIAL NETWORK: HOW OFTEN DO YOU ATTEND CHURCH OR RELIGIOUS SERVICES?: PATIENT UNABLE TO ANSWER

## 2025-03-24 SDOH — SOCIAL STABILITY: SOCIAL INSECURITY
WITHIN THE LAST YEAR, HAVE YOU BEEN KICKED, HIT, SLAPPED, OR OTHERWISE PHYSICALLY HURT BY YOUR PARTNER OR EX-PARTNER?: NO

## 2025-03-24 SDOH — HEALTH STABILITY: MENTAL HEALTH
DO YOU FEEL STRESS - TENSE, RESTLESS, NERVOUS, OR ANXIOUS, OR UNABLE TO SLEEP AT NIGHT BECAUSE YOUR MIND IS TROUBLED ALL THE TIME - THESE DAYS?: PATIENT UNABLE TO ANSWER

## 2025-03-24 SDOH — ECONOMIC STABILITY: FOOD INSECURITY: WITHIN THE PAST 12 MONTHS, YOU WORRIED THAT YOUR FOOD WOULD RUN OUT BEFORE YOU GOT THE MONEY TO BUY MORE.: NEVER TRUE

## 2025-03-24 SDOH — SOCIAL STABILITY: SOCIAL NETWORK: HOW OFTEN DO YOU GET TOGETHER WITH FRIENDS OR RELATIVES?: PATIENT UNABLE TO ANSWER

## 2025-03-24 SDOH — HEALTH STABILITY: PHYSICAL HEALTH
ON AVERAGE, HOW MANY DAYS PER WEEK DO YOU ENGAGE IN MODERATE TO STRENUOUS EXERCISE (LIKE A BRISK WALK)?: PATIENT UNABLE TO ANSWER

## 2025-03-24 SDOH — SOCIAL STABILITY: SOCIAL INSECURITY: WITHIN THE LAST YEAR, HAVE YOU BEEN HUMILIATED OR EMOTIONALLY ABUSED IN OTHER WAYS BY YOUR PARTNER OR EX-PARTNER?: NO

## 2025-03-24 SDOH — HEALTH STABILITY: PHYSICAL HEALTH: ON AVERAGE, HOW MANY MINUTES DO YOU ENGAGE IN EXERCISE AT THIS LEVEL?: PATIENT UNABLE TO ANSWER

## 2025-03-24 SDOH — ECONOMIC STABILITY: FOOD INSECURITY
HOW HARD IS IT FOR YOU TO PAY FOR THE VERY BASICS LIKE FOOD, HOUSING, MEDICAL CARE, AND HEATING?: PATIENT UNABLE TO ANSWER

## 2025-03-24 SDOH — SOCIAL STABILITY: SOCIAL INSECURITY
WITHIN THE LAST YEAR, HAVE YOU BEEN RAPED OR FORCED TO HAVE ANY KIND OF SEXUAL ACTIVITY BY YOUR PARTNER OR EX-PARTNER?: NO

## 2025-03-24 SDOH — SOCIAL STABILITY: SOCIAL INSECURITY: WITHIN THE LAST YEAR, HAVE YOU BEEN AFRAID OF YOUR PARTNER OR EX-PARTNER?: NO

## 2025-03-24 SDOH — ECONOMIC STABILITY: HOUSING INSECURITY: IN THE LAST 12 MONTHS, WAS THERE A TIME WHEN YOU WERE NOT ABLE TO PAY THE MORTGAGE OR RENT ON TIME?: NO

## 2025-03-24 SDOH — HEALTH STABILITY: MENTAL HEALTH: HOW MANY DRINKS CONTAINING ALCOHOL DO YOU HAVE ON A TYPICAL DAY WHEN YOU ARE DRINKING?: PATIENT DOES NOT DRINK

## 2025-03-24 SDOH — SOCIAL STABILITY: SOCIAL INSECURITY: ARE YOU MARRIED, WIDOWED, DIVORCED, SEPARATED, NEVER MARRIED, OR LIVING WITH A PARTNER?: MARRIED

## 2025-03-24 SDOH — ECONOMIC STABILITY: TRANSPORTATION INSECURITY: IN THE PAST 12 MONTHS, HAS LACK OF TRANSPORTATION KEPT YOU FROM MEDICAL APPOINTMENTS OR FROM GETTING MEDICATIONS?: NO

## 2025-03-24 ASSESSMENT — SLEEP AND FATIGUE QUESTIONNAIRES
DO YOU USE A SLEEP AID: YES
SLEEP PATTERN: DIFFICULTY FALLING ASLEEP;DISTURBED/INTERRUPTED SLEEP;INSOMNIA;RESTLESSNESS
AVERAGE NUMBER OF SLEEP HOURS: 4
DO YOU USE A SLEEP AID: YES
DO YOU HAVE DIFFICULTY SLEEPING: YES
SLEEP PATTERN: DIFFICULTY FALLING ASLEEP;DISTURBED/INTERRUPTED SLEEP;RESTLESSNESS
DO YOU HAVE DIFFICULTY SLEEPING: YES
AVERAGE NUMBER OF SLEEP HOURS: 4

## 2025-03-24 ASSESSMENT — PAIN SCALES - GENERAL
PAINLEVEL_OUTOF10: 0
PAINLEVEL_OUTOF10: 0

## 2025-03-24 ASSESSMENT — ACTIVITIES OF DAILY LIVING (ADL): LACK_OF_TRANSPORTATION: NO
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
## 2025-03-24 NOTE — BH NOTE
[facility]  Admission Note     Admission Type: Involuntary       Reason for admission:Dementia with behavioral disturbance         Addictive Behavior: none       Medical Problems:   Past Medical History:   Diagnosis Date    B12 deficiency     Hypertriglyceridemia     NOEMI (obstructive sleep apnea)     Prediabetes     Senile dementia of Alzheimer type (HCC)     Vitamin D deficiency        Status EXAM:  Mental Status and Behavioral Exam  Normal: No  Level of Assistance: Partial assist  Facial Expression: Flat, Brightened  Affect: Blunt  Level of Consciousness: Confused  Frequency of Checks: 1 staff: 1 patient  - continuous  Mood:Normal: No  Mood: Anxious, Suspicious  Motor Activity:Normal: No  Motor Activity: Increased  Eye Contact: Fair  Observed Behavior: Compulsive, Impulsive, Cooperative, Guarded  Sexual Misconduct History: Current - no  Preception: [place] to person  Attention:Normal: No  Attention: Distractible, Unable to concentrate  Thought Processes: Tangential  Thought Content:Normal: No  Thought Content: Preoccupations  Depression Symptoms: Impaired concentration, Change in energy level  Anxiety Symptoms: Generalized  Shannan Symptoms: Poor judgment  Hallucinations: None  Delusions: No  Memory:Normal: No  Memory: Poor recent, Poor remote  Insight and Judgment: No  Insight and Judgment: Poor judgment, Poor insight    Tobacco Screening:  Practical Counseling, on admission, judy X, if applicable and completed (first 3 are required if patient doesn't refuse)n/a:            ( ) Recognizing danger situations (included triggers and roadblocks)                    ( ) Coping skills (new ways to manage stress,relaxation techniques, changing routine, distraction)                                                           ( ) Basic information about quitting (benefits of quitting, techniques in how to quit, available resources  ( ) [facility]

## 2025-03-24 NOTE — GROUP NOTE
Group Therapy Note    Date: 3/24/2025    Group Start Time: 1330  Group End Time: 1415  Group Topic: Activity    Mitzy Kelsey        Group Therapy Note    Attendees: 4    Group members played 15 seconds of a song from the 70's, 80's  and 90's and had to guess who sang the song and what the title of the song was. The goal of group was to evoke memories, stimulate mental activity, promote social interaction, and improve well-being.       Notes:  Patient remained in group the full duration. Patient remained appropriate during his time in group.     Endings: None Reported    Modes of Intervention: Socialization, Exploration, and Activity      Discipline Responsible: Behavorial Health Tech      Signature:  FAUSTINA ANN

## 2025-03-24 NOTE — CARE COORDINATION
DISCHARGE SUMMARY     DATE OF DISCHARGE: 3/24/2025    DISCHARGE DESTINATION: Alesia Richardson christina    TRANSPORTATION: Stretcher     Time: 0815AM    Electronically signed by Jessica Gee on 3/24/2025 at 8:50 AM  #[phone]

## 2025-03-24 NOTE — BH NOTE
4 Eyes Skin Assessment     The patient is being assessed for  Admission    I agree that 2 RN's have performed a thorough Head to Toe Skin Assessment on the patient. ALL assessment sites listed below have been assessed.       Areas assessed for pressure by both nurses: yes  [x]   Head, Face, and Ears   [x]   Shoulders, Back, and Chest  [x]   Arms, Elbows, and Hands   [x]   Coccyx, Sacrum, and Ischum  [x]   Legs, Feet, and Heels                                Skin Assessed Under all Medical Devices by both nurses:  none              All Mepilex Borders were peeled back and area peeked at by both nurses:  N/A  Please list where Mepilex Borders are located:  n/a                 Does the Patient have Skin Breakdown related to pressure?  No     (Insert Photo here) N/A         García Prevention initiated:  NA   Wound Care Orders initiated:  NA      WOC nurse consulted for Pressure Injury (Stage 3,4, Unstageable, DTI, NWPT, Complex wounds)and New or Established Ostomies:  NA        Nurse 1 eSignature: Electronically signed by Ree Balderas RN on 3/24/25 at 9:37 AM EDT    **SHARE this note so that the co-signing nurse is able to place an eSignature**    Nurse 2 eSignature: Electronically signed by Ely Casarez RN on 3/24/25 at 9:38 AM EDT   [facility] Good Help to Those in Need 
[phone] Patient Name: Yaw Fernandez YOB: 1955 Age: 71 y.o. [facility] LCSW Note:  Hospice consult noted. Chart reviewed. Plan of care discussed with patients nurse & care manager. This LCSW and Saniya Andrews RN met with pt's  Rosendo Ocasio ( [phone]), son Kristin Gonzales ( [phone]) and his wife Paul Kenney ( [phone]), son Jimmy Galarza \"Gavin\" Janet Kaiser ( primary MPOA,, [phone]) who lives in [place] was on speaker phone, for family meeting re hospice services. Pt is in agreement with hospice services, however hospice team and family met outside ts room to discuss safe plan of care ie; who would be primary caregivers, DME, ect. .  
 
The plan going into the meeting was for pt to be dc'd home with hospice, however pt's pain is not yet controlled. Palliative Care NP Ofelia Holstein was part of the meeting to address pain issues with pt. The decision has been made to start pt on PCA pump today for pain. Hospice liaison will reassess tomorrow to see if pt's pain is better controlled and if she will be able to transition to oral meds. Pt/family is in agreement with pts being admitted to [address] [place] [address] for symptom management, with the goal of returning home. Consents and ABN forms were signed by pt., they will need to be dated upon admission. It would be helpful to have MSW present upon admission as there are psychosocial issues. ; pt has a hx of anxiety and depression, family discord, marital issues, and daughter who is incarcerated. Family also need information re body donation. Thank you for the opportunity to be of service to this Mrs. Granados Favorite and her family. Amada FANGW, MSG [facility] [phone]
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
## 2025-03-24 NOTE — PROGRESS NOTES
Transport here to take pt to [facility]. Report called to CIPRIANO Keenan, on [facility]. All questions answered. Pt sent with belongings.

## 2025-03-24 NOTE — H&P
Hospital Medicine History & Physical      PCP: Doroteo Jha MD    Date of Admission: 3/24/2025    Date of Service: Pt seen/examined on 03/24/25       Chief Complaint:  No chief complaint on file.        History Of Present Illness:      The patient is a 69 y.o. male with Alzheimer's dementia w/behavioral disturbances who presented to [facility] for agitation and aggression. Patient was seen and evaluated in the ED by the ED medical provider, patient was medically cleared for admission to [facility].  This note serves as an admission medical H&P.    Tobacco use: quit 2011  ETOH use: quit 15+ years ago  Illicit drug use: denies     Patient denies any medical complaints.    Past Medical History:        Diagnosis Date    B12 deficiency     Hypertriglyceridemia     NOEMI (obstructive sleep apnea)     Prediabetes     Senile dementia of Alzheimer type (HCC)     Vitamin D deficiency        Past Surgical History:    No past surgical history on file.    Medications Prior to Admission:    Prior to Admission medications    Medication Sig Start Date End Date Taking? Authorizing Provider   fenofibrate 160 MG tablet Take 1 tablet by mouth daily 3/7/25   Christie Alvarado MD   sertraline (ZOLOFT) 100 MG tablet Take 1.5 tablets by mouth daily 3/6/25   Christie Alvarado MD   atorvastatin (LIPITOR) 20 MG tablet Take 1 tablet by mouth daily 10/18/23   Christie Alvarado MD   donepezil (ARICEPT) 10 MG tablet Take 1 tablet by mouth daily (with breakfast) 10/23/23   Christie Alvardao MD       Allergies:  Patient has no known allergies.    Social History:  The patient currently lives with wife and dog.    TOBACCO:   reports that he has never smoked. He has never used smokeless tobacco.  ETOH:   reports no history of alcohol use.      Family History:   Positive as follows:    No family history on file.    REVIEW OF SYSTEMS:     Limited 2/2 memory issues.  Constitutional: Negative for fever   HENT: Negative for sore throat
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
## 2025-03-24 NOTE — BH NOTE
Bedside Mobility Assessment Tool (BMAT):     Assessment Level 1- Sit and Shake    1. From a semi-reclined position, ask patient to sit up and rotate to a seated position at the side of the bed. Can use the bedrail.    2. Ask patient to reach out and grab your hand and shake making sure patient reaches across his/her midline.   Pass- Patient is able to come to a seated position, maintain core strength. Maintains seated balance while reaching across midline. Move on to Assessment Level 2.     Assessment Level 2- Stretch and Point   1. With patient in seated position at the side of the bed, have patient place both feet on the floor (or stool) with knees no higher than hips.    2. Ask patient to stretch one leg and straighten the knee, then bend the ankle/flex and point the toes. If appropriate, repeat with the other leg.   Fail- Patient is unable to complete task. Patient is MOBILITY LEVEL 2.     Assessment Level 3- Stand   1. Ask patient to elevate off the bed or chair (seated to standing) using an assistive device (cane, bedrail).    2. Patient should be able to raise buttocks off be and hold for a count of five. May repeat once.   Fail- Patient unable to demonstrate standing stability. Patient is MOBILITY LEVEL 3.     Assessment Level 4- Walk   1. Ask patient to march in place at bedside.    2. Then ask patient to advance step and return each foot. Some medical conditions may render a patient from stepping backwards, use your best clinical judgement.   Fail- Patient not able to complete tasks OR requires use of assistive device. Patient is MOBILITY LEVEL 3.       Mobility Level- 3

## 2025-03-24 NOTE — BH NOTE
Pt arrived on unit at 0905 accompanied by security and transportation staff. Soft wrist restraints and 22 gauge IV in the right forearm removed immediately. 1:1 for safety started.

## 2025-03-25 PROCEDURE — 99233 SBSQ HOSP IP/OBS HIGH 50: CPT

## 2025-03-25 PROCEDURE — 6370000000 HC RX 637 (ALT 250 FOR IP): Performed by: PSYCHIATRY & NEUROLOGY

## 2025-03-25 PROCEDURE — 1240000000 HC EMOTIONAL WELLNESS R&B

## 2025-03-25 PROCEDURE — 6370000000 HC RX 637 (ALT 250 FOR IP)

## 2025-03-25 RX ORDER — QUETIAPINE FUMARATE 25 MG/1
50 TABLET, FILM COATED ORAL EVERY MORNING
Status: DISCONTINUED | OUTPATIENT
Start: 2025-03-26 | End: 2025-04-15 | Stop reason: HOSPADM

## 2025-03-25 RX ORDER — QUETIAPINE FUMARATE 100 MG/1
100 TABLET, FILM COATED ORAL NIGHTLY
Status: DISCONTINUED | OUTPATIENT
Start: 2025-03-25 | End: 2025-04-15 | Stop reason: HOSPADM

## 2025-03-25 RX ORDER — MECOBALAMIN 5000 MCG
10 TABLET,DISINTEGRATING ORAL
Status: DISCONTINUED | OUTPATIENT
Start: 2025-03-25 | End: 2025-04-15 | Stop reason: HOSPADM

## 2025-03-25 RX ADMIN — DONEPEZIL HYDROCHLORIDE 10 MG: 5 TABLET, FILM COATED ORAL at 11:54

## 2025-03-25 RX ADMIN — TRAZODONE HYDROCHLORIDE 50 MG: 50 TABLET ORAL at 20:00

## 2025-03-25 RX ADMIN — QUETIAPINE FUMARATE 100 MG: 100 TABLET ORAL at 20:00

## 2025-03-25 RX ADMIN — SERTRALINE 150 MG: 100 TABLET, FILM COATED ORAL at 11:53

## 2025-03-25 RX ADMIN — ATORVASTATIN CALCIUM 20 MG: 10 TABLET, FILM COATED ORAL at 11:54

## 2025-03-25 RX ADMIN — QUETIAPINE FUMARATE 50 MG: 25 TABLET ORAL at 11:53

## 2025-03-25 RX ADMIN — Medication 10 MG: at 17:03

## 2025-03-25 NOTE — GROUP NOTE
Group Therapy Note    Date: 3/25/2025    Group Start Time: 1300  Group End Time: 1345  Group Topic: Recreational    [facility]    Lakesha Gracia LPC    Group Documentation  The group was intended to play a card game which promotes members to share their childhood memories with each other. The game works on memory recall and social skills. Only one member attended group.    Attendees: 1           Notes:  Patient was invited but refused to attend.      Signature:  Lakesha Gracia LPC

## 2025-03-25 NOTE — BH NOTE
[facility]  Treatment Team Note  Review Date & Time: 3/25/2025  9:30 am    Patient was not in treatment team      Status EXAM:   Mental Status and Behavioral Exam  Normal: No  Level of Assistance: Partial assist  Facial Expression: Flat  Affect: Blunt  Level of Consciousness: Confused  Frequency of Checks: 1 staff: 1 patient  - continuous  Mood:Normal: No  Mood: Anxious, Suspicious, Irritable  Motor Activity:Normal: No  Motor Activity: Other (comment) (Unsteady)  Eye Contact: Good  Observed Behavior: Cooperative, Impulsive, Hypermobile, Exit seeking  Sexual Misconduct History: Current - no  Preception: [place] to person  Attention:Normal: No  Attention: Unable to concentrate  Thought Processes: Tangential, Flight of ideas  Thought Content:Normal: No  Thought Content: Poverty of content  Depression Symptoms: Impaired concentration, Increased irritability, Sleep disturbance  Anxiety Symptoms: Generalized  Shannan Symptoms: Less need to sleep, Poor judgment  Hallucinations: None  Delusions: No  Memory:Normal: No  Memory: Confabulation, Poor recent, Poor remote  Insight and Judgment: No  Insight and Judgment: Poor judgment, Poor insight      Suicide Risk CSSR-S:  1) Within the past month, have you wished you were dead or wished you could go to sleep and not wake up? : No  2) Have you actually had any thoughts of killing yourself? : No  6) Have you ever done anything, started to do anything, or prepared to do anything to end your life?: No      PLAN/TREATMENT RECOMMENDATIONS UPDATE: Patient will take medication as prescribed, eat 75% of meals, attend groups, participate in milieu activities, participate in treatment team and care planning for discharge and follow up.           Candice Willson RN

## 2025-03-25 NOTE — BH NOTE
Pt out of room for dinner and he stays out for a while. Pt not interactive with peers but is present. Pt is very quiet and doesn't speak unless necessary. 1:1  remains at pt side for safety. No visitors this shift. Pt denies SI/HI and AVH and is not noted to be RTIS. + meds taken whole without issue. No behavioral issues.

## 2025-03-25 NOTE — BH NOTE
Talked with wife Susana, wife reports that she is DPOA and will bring paperwork today. Wife gave consent for voluntary admission.

## 2025-03-25 NOTE — BH NOTE
Phone call from Spencer's son, Deng Ingram.  Deng's phone number is [phone].  Deng asked to speak with his father.  A cordless phone was taken to Spencer. They talked for a few minutes when Spencer became loud. This nurse walked out to where Spencer was sitting and Spencer handed this nurse the phone.  Spencer was able to calm within a few minutes of ending the call.

## 2025-03-26 PROCEDURE — 6370000000 HC RX 637 (ALT 250 FOR IP)

## 2025-03-26 PROCEDURE — 97530 THERAPEUTIC ACTIVITIES: CPT

## 2025-03-26 PROCEDURE — 6370000000 HC RX 637 (ALT 250 FOR IP): Performed by: PSYCHIATRY & NEUROLOGY

## 2025-03-26 PROCEDURE — 99233 SBSQ HOSP IP/OBS HIGH 50: CPT

## 2025-03-26 PROCEDURE — 1240000000 HC EMOTIONAL WELLNESS R&B

## 2025-03-26 RX ADMIN — DONEPEZIL HYDROCHLORIDE 10 MG: 5 TABLET, FILM COATED ORAL at 09:35

## 2025-03-26 RX ADMIN — HYDROXYZINE HYDROCHLORIDE 50 MG: 50 TABLET ORAL at 23:30

## 2025-03-26 RX ADMIN — ATORVASTATIN CALCIUM 20 MG: 10 TABLET, FILM COATED ORAL at 09:34

## 2025-03-26 RX ADMIN — QUETIAPINE FUMARATE 50 MG: 25 TABLET ORAL at 09:35

## 2025-03-26 RX ADMIN — SERTRALINE 150 MG: 100 TABLET, FILM COATED ORAL at 09:34

## 2025-03-26 RX ADMIN — Medication 10 MG: at 17:04

## 2025-03-26 RX ADMIN — TRAZODONE HYDROCHLORIDE 50 MG: 50 TABLET ORAL at 21:10

## 2025-03-26 RX ADMIN — QUETIAPINE FUMARATE 100 MG: 100 TABLET ORAL at 21:10

## 2025-03-26 NOTE — GROUP NOTE
Group Therapy Note    Date: 3/26/2025    Group Start Time: 1330  Group End Time: 1415  Group Topic: Cognitive Skills    [facility]        Group Therapy Note    Attendees: 5    Group members played 15 seconds of a theme song from the 80's and had to guess what show the song belonged to. The goal of group was to evoke memories, stimulate mental activity, promote social interaction, and improve well-being.       Notes:  Patient attended group for the full duration. Patient remained engaged and interacted approrpiately with peers.    Status After Intervention:  Improved    Participation Level: Active Listener    Participation Quality: Appropriate      Speech:  normal      Thought Process/Content: Logical      Affective Functioning: Congruent      Mood: euthymic      Level of consciousness:  Alert      Response to Learning: Able to verbalize current knowledge/experience      Endings: None Reported    Modes of Intervention: Socialization, Exploration, and Activity      Discipline Responsible: Behavorial Health Tech      Signature:  FAUSTINA ANN

## 2025-03-26 NOTE — GROUP NOTE
Group Therapy Note    Date: 3/26/2025    Group Start Time: 1000  Group End Time: 1045  Group Topic: Recreational    [facility]    Zabrina Davis LISW        Group Therapy Note    Attendees: 6       Patient's Goal:  pt's attended reminiscing group for socialization and to stimulate cognition.    Notes:  pt attended group for the full duration. He  participated in group activity and interacted appropriately with others.      Status After Intervention:  Improved    Participation Level: Active Listener and Interactive    Participation Quality: Appropriate, Attentive, and Sharing      Speech:  normal      Thought Process/Content: Logical      Affective Functioning: Congruent      Mood: euthymic      Level of consciousness:  Alert and Attentive      Response to Learning: Able to verbalize current knowledge/experience      Endings: None Reported    Modes of Intervention: Education, Support, Socialization, Exploration, and Clarifying      Discipline Responsible: /Counselor      Signature:  FADI Dumont

## 2025-03-26 NOTE — CARE COORDINATION
Spoke with pt's wife regarding placement process and potential placement options. Wife lives in [place]. It would be ideal to have placement close to her home, but she is open to a farther placement if it is best for pt. They would be able to private pay at least 1-2 months and then pt would need to transition to Medicaid.

## 2025-03-26 NOTE — GROUP NOTE
Group Therapy Note    Date: 3/26/2025    Group Start Time: 1100  Group End Time: 1145  Group Topic: Psychoeducation    [facility]    Radha Figueroa LISW        Group Therapy Note    Patient was able to participate in self care group and participate in activity for relapse prevention.    Attendees: 6       Notes:  Patient was quiet during group. Patient was able to verbalize self care skills. Patient was open to participating in activity.    Status After Intervention:  Improved    Participation Level: Active Listener and Interactive    Participation Quality: Appropriate      Speech:  normal      Thought Process/Content: Logical      Affective Functioning: Congruent      Mood: euthymic      Level of consciousness:  Alert      Response to Learning: Able to retain information      Endings: None Reported    Modes of Intervention: Education, Support, Exploration, and Activity      Discipline Responsible: /Counselor      Signature:  FADI Arenas

## 2025-03-27 PROCEDURE — 6370000000 HC RX 637 (ALT 250 FOR IP): Performed by: PSYCHIATRY & NEUROLOGY

## 2025-03-27 PROCEDURE — 1240000000 HC EMOTIONAL WELLNESS R&B

## 2025-03-27 PROCEDURE — 99233 SBSQ HOSP IP/OBS HIGH 50: CPT

## 2025-03-27 PROCEDURE — 6370000000 HC RX 637 (ALT 250 FOR IP)

## 2025-03-27 RX ADMIN — QUETIAPINE FUMARATE 50 MG: 25 TABLET ORAL at 09:33

## 2025-03-27 RX ADMIN — Medication 10 MG: at 18:42

## 2025-03-27 RX ADMIN — HYDROXYZINE HYDROCHLORIDE 50 MG: 50 TABLET ORAL at 21:05

## 2025-03-27 RX ADMIN — TRAZODONE HYDROCHLORIDE 50 MG: 50 TABLET ORAL at 21:05

## 2025-03-27 RX ADMIN — ATORVASTATIN CALCIUM 20 MG: 10 TABLET, FILM COATED ORAL at 09:33

## 2025-03-27 RX ADMIN — DONEPEZIL HYDROCHLORIDE 10 MG: 5 TABLET, FILM COATED ORAL at 09:33

## 2025-03-27 RX ADMIN — QUETIAPINE FUMARATE 100 MG: 100 TABLET ORAL at 21:05

## 2025-03-27 RX ADMIN — SERTRALINE 150 MG: 100 TABLET, FILM COATED ORAL at 09:33

## 2025-03-27 NOTE — BH NOTE
[facility]  Treatment Team Note  Review Date & Time: 3/27/2025   0900    Patient was not in treatment team      Status EXAM:   Mental Status and Behavioral Exam  Normal: No  Level of Assistance: Independent/Self  Facial Expression: Flat  Affect: Congruent, Stable  Level of Consciousness: Confused  Frequency of Checks: 4 times per hour, close  Mood:Normal: No  Mood: Anxious  Motor Activity:Normal: No  Motor Activity: Decreased  Eye Contact: Good  Observed Behavior: Cooperative, Friendly, Withdrawn  Sexual Misconduct History: Current - no  Preception: [place] to person  Attention:Normal: No  Attention: Unable to concentrate, Distractible  Thought Processes: Circumstantial  Thought Content:Normal: No  Thought Content: Poverty of content  Depression Symptoms: Change in energy level, Impaired concentration, Loss of interest, Isolative, Sleep disturbance  Anxiety Symptoms: Generalized  Shannan Symptoms: No problems reported or observed.  Hallucinations: None  Delusions: No  Memory:Normal: No  Memory: Poor recent, Poor remote  Insight and Judgment: No  Insight and Judgment: Poor judgment, Poor insight      Suicide Risk CSSR-S:  1) Within the past month, have you wished you were dead or wished you could go to sleep and not wake up? : No  2) Have you actually had any thoughts of killing yourself? : No  6) Have you ever done anything, started to do anything, or prepared to do anything to end your life?: No      PLAN/TREATMENT RECOMMENDATIONS UPDATE:   Patient will take medication as prescribed, eat 75% of meals, attend groups, participate in milieu activities, participate in treatment team and care planning for discharge and follow up.            Sekou Ruby RN

## 2025-03-27 NOTE — DISCHARGE SUMMARY
V2.0  Discharge Summary    Name:  Spencer Ingram /Age/Sex: 1955 (69 y.o. male)   Admit Date: 3/22/2025  Discharge Date: 3/24/25    MRN & CSN:  [id number] & [id number] Encounter Date and Time 3/24/25 2:18 PM EDT    Attending:  No att. providers found Discharging Provider: Ramona Cuevas MD       Hospital Course:     Mr. Ingram was admitted with agitation and behavioral disturbance with underlying dementia.  Had been progressively getting worse outpatient.  He was evaluated by psychiatry who recommended transfer to inpatient geriatric psychiatry.  He had no other medical issues and was stable for discharge.    The patient expressed appropriate understanding of, and agreement with the discharge recommendations, medications, and plan.     Consults this admission:  IP CONSULT TO HOSPITALIST  IP CONSULT TO PSYCHIATRY  IP CONSULT TO TELE-PSYCH PROVIDER    Discharge Diagnosis:   Dementia with behavioral disturbance (HCC)        Discharge Instruction:   Follow up appointments: inpatient geriatric psychiatry  Primary care physician: Doroteo Jha MD within 1 week  Diet: regular diet   Activity: activity as tolerated  Disposition: Discharged to: inpatient geriatric psychiatry  Condition on discharge: Stable  Labs and Tests to be Followed up as an outpatient by PCP or Specialist: none pending    Discharge Medications:        Medication List        CONTINUE taking these medications      atorvastatin 20 MG tablet  Commonly known as: LIPITOR     donepezil 10 MG tablet  Commonly known as: ARICEPT     fenofibrate 160 MG tablet     sertraline 100 MG tablet  Commonly known as: ZOLOFT             Objective Findings at Discharge:   /68   Pulse 52   Temp 98 °F (36.7 °C) (Oral)   Resp 16   Ht 1.829 m (6')   Wt 88.2 kg (194 lb 7.1 oz)   SpO2 96%   BMI 26.37 kg/m²       Physical Exam:     General: NAD  Eyes: EOMI  ENT: neck supple  Cardiovascular: Regular rate.  Respiratory: Clear to auscultation  Gastrointestinal: Soft,

## 2025-03-28 PROCEDURE — 1240000000 HC EMOTIONAL WELLNESS R&B

## 2025-03-28 PROCEDURE — 6370000000 HC RX 637 (ALT 250 FOR IP): Performed by: PSYCHIATRY & NEUROLOGY

## 2025-03-28 PROCEDURE — 6370000000 HC RX 637 (ALT 250 FOR IP)

## 2025-03-28 PROCEDURE — 99233 SBSQ HOSP IP/OBS HIGH 50: CPT | Performed by: PSYCHIATRY & NEUROLOGY

## 2025-03-28 RX ADMIN — ATORVASTATIN CALCIUM 20 MG: 10 TABLET, FILM COATED ORAL at 10:14

## 2025-03-28 RX ADMIN — SERTRALINE 150 MG: 100 TABLET, FILM COATED ORAL at 10:14

## 2025-03-28 RX ADMIN — QUETIAPINE FUMARATE 50 MG: 25 TABLET ORAL at 10:14

## 2025-03-28 RX ADMIN — QUETIAPINE FUMARATE 100 MG: 100 TABLET ORAL at 20:10

## 2025-03-28 RX ADMIN — HYDROXYZINE HYDROCHLORIDE 50 MG: 50 TABLET ORAL at 20:10

## 2025-03-28 RX ADMIN — TRAZODONE HYDROCHLORIDE 50 MG: 50 TABLET ORAL at 20:10

## 2025-03-28 RX ADMIN — Medication 10 MG: at 17:06

## 2025-03-28 RX ADMIN — DONEPEZIL HYDROCHLORIDE 10 MG: 5 TABLET, FILM COATED ORAL at 10:14

## 2025-03-28 ASSESSMENT — PAIN SCALES - GENERAL: PAINLEVEL_OUTOF10: 0

## 2025-03-28 NOTE — GROUP NOTE
Group Therapy Note    Date: 3/28/2025    Group Start Time: 1100  Group End Time: 1145  Group Topic: Recreational    [facility]    Lakesha Gracia LPC    Group Documentation  The group activity was memory trivia. The activity was chose to facilitate memory recall and socialization. The level of cohesion of the group was fair with only 2 members attending.    Attendees: 2           Notes:  Patient invited to group but refused to attend.      Signature:  Lakesha Gracia LPC

## 2025-03-28 NOTE — GROUP NOTE
Group Therapy Note    Date: 3/28/2025    Group Start Time: 1330  Group End Time: 1415  Group Topic: Recreational    [facility] Mitzy Aldana        Group Therapy Note    Attendees: 2    Patients listened to \"Suresh Luna\" music and engaged in conversation about it.      Notes:  Patient entered group late. Patient remained appropriate.     Endings: None Reported    Modes of Intervention: Socialization, Exploration, and Activity      Discipline Responsible: Behavorial Health Tech      Signature:  FAUSTINA ANN

## 2025-03-28 NOTE — CARE COORDINATION
Spoke with Elaina in admissions with [facility] initiated referral for LTC        Attempted to call pt's wife to provide update. Call rang several times then disconnected. Unable to leave a message.        19:05 - PASRR submitted

## 2025-03-29 PROCEDURE — 1240000000 HC EMOTIONAL WELLNESS R&B

## 2025-03-29 PROCEDURE — 6370000000 HC RX 637 (ALT 250 FOR IP): Performed by: PSYCHIATRY & NEUROLOGY

## 2025-03-29 PROCEDURE — 99233 SBSQ HOSP IP/OBS HIGH 50: CPT

## 2025-03-29 PROCEDURE — 6370000000 HC RX 637 (ALT 250 FOR IP)

## 2025-03-29 RX ADMIN — DONEPEZIL HYDROCHLORIDE 10 MG: 5 TABLET, FILM COATED ORAL at 09:45

## 2025-03-29 RX ADMIN — SERTRALINE 150 MG: 100 TABLET, FILM COATED ORAL at 09:45

## 2025-03-29 RX ADMIN — TRAZODONE HYDROCHLORIDE 50 MG: 50 TABLET ORAL at 19:51

## 2025-03-29 RX ADMIN — ATORVASTATIN CALCIUM 20 MG: 10 TABLET, FILM COATED ORAL at 09:45

## 2025-03-29 RX ADMIN — Medication 10 MG: at 18:37

## 2025-03-29 RX ADMIN — QUETIAPINE FUMARATE 50 MG: 25 TABLET ORAL at 09:45

## 2025-03-29 RX ADMIN — QUETIAPINE FUMARATE 100 MG: 100 TABLET ORAL at 19:51

## 2025-03-29 ASSESSMENT — PAIN SCALES - GENERAL: PAINLEVEL_OUTOF10: 0

## 2025-03-29 NOTE — BH NOTE
Pt is alert and oriented x1. Denies all. Took medications whole with water. Exit seeking. Pleasantly confused. Up at the desk frequently asking why he is here. Had a episode of urine incontinence. Has been walking well with safety precautions intact. Denies any needs at this time.

## 2025-03-30 PROCEDURE — 6370000000 HC RX 637 (ALT 250 FOR IP)

## 2025-03-30 PROCEDURE — 6370000000 HC RX 637 (ALT 250 FOR IP): Performed by: PSYCHIATRY & NEUROLOGY

## 2025-03-30 PROCEDURE — 1240000000 HC EMOTIONAL WELLNESS R&B

## 2025-03-30 RX ADMIN — OLANZAPINE 5 MG: 5 TABLET, FILM COATED ORAL at 14:29

## 2025-03-30 RX ADMIN — ATORVASTATIN CALCIUM 20 MG: 10 TABLET, FILM COATED ORAL at 09:12

## 2025-03-30 RX ADMIN — SERTRALINE 150 MG: 100 TABLET, FILM COATED ORAL at 09:12

## 2025-03-30 RX ADMIN — HYDROXYZINE HYDROCHLORIDE 50 MG: 50 TABLET ORAL at 17:02

## 2025-03-30 RX ADMIN — QUETIAPINE FUMARATE 100 MG: 100 TABLET ORAL at 20:41

## 2025-03-30 RX ADMIN — DONEPEZIL HYDROCHLORIDE 10 MG: 5 TABLET, FILM COATED ORAL at 09:12

## 2025-03-30 RX ADMIN — QUETIAPINE FUMARATE 50 MG: 25 TABLET ORAL at 09:12

## 2025-03-30 RX ADMIN — Medication 10 MG: at 17:02

## 2025-03-30 ASSESSMENT — PAIN SCALES - GENERAL: PAINLEVEL_OUTOF10: 0

## 2025-03-30 NOTE — BH NOTE
Patient has been polite, cooperative, and med complaint early in shift.  He wonders around the unit and often stands by the door reporting he is ready to leave and has things he needs to get done today.  Easily redirected.  Ate well for meals.  Denies any depression or anxiety.  Denies all.  A&O to self only.  Late afternoon pt started asking how to leave and testing the doors to the unit.  Reoriented multiple times on situation and inability to leave at this time.  Pt kept saying \"alright, see you tomorrow.\" And heading towards the door.  Started to get a little agitated but still attempted to stay pleasant.  PRN Zyprexa given.  Zyprexa effective.  Pt reports anxiety PRN Atarax given.  Effective.

## 2025-03-31 PROCEDURE — 6370000000 HC RX 637 (ALT 250 FOR IP): Performed by: PSYCHIATRY & NEUROLOGY

## 2025-03-31 PROCEDURE — 6370000000 HC RX 637 (ALT 250 FOR IP)

## 2025-03-31 PROCEDURE — 1240000000 HC EMOTIONAL WELLNESS R&B

## 2025-03-31 PROCEDURE — 99233 SBSQ HOSP IP/OBS HIGH 50: CPT

## 2025-03-31 RX ADMIN — QUETIAPINE FUMARATE 50 MG: 25 TABLET ORAL at 08:52

## 2025-03-31 RX ADMIN — TRAZODONE HYDROCHLORIDE 50 MG: 50 TABLET ORAL at 20:47

## 2025-03-31 RX ADMIN — ATORVASTATIN CALCIUM 20 MG: 10 TABLET, FILM COATED ORAL at 08:52

## 2025-03-31 RX ADMIN — Medication 10 MG: at 17:56

## 2025-03-31 RX ADMIN — SERTRALINE 150 MG: 100 TABLET, FILM COATED ORAL at 08:52

## 2025-03-31 RX ADMIN — QUETIAPINE FUMARATE 100 MG: 100 TABLET ORAL at 20:47

## 2025-03-31 RX ADMIN — DONEPEZIL HYDROCHLORIDE 10 MG: 5 TABLET, FILM COATED ORAL at 08:52

## 2025-03-31 NOTE — BH NOTE
[facility]  Treatment Team Note  Review Date & Time: 3/31/2025  9:24 am    Patient was not in treatment team      Status EXAM:   Mental Status and Behavioral Exam  Normal: No  Level of Assistance: Independent/Self  Facial Expression: Flat  Affect: Blunt  Level of Consciousness: Confused  Frequency of Checks: 4 times per hour, close  Mood:Normal: No  Mood: Anxious, Empty, Helpless  Motor Activity:Normal: No  Motor Activity: Decreased  Eye Contact: Good  Observed Behavior: Friendly, Cooperative, Impulsive  Sexual Misconduct History: Current - no  Preception: [place] to person  Attention:Normal: No  Attention: Unable to concentrate  Thought Processes: Blocking  Thought Content:Normal: No  Thought Content: Poverty of content  Depression Symptoms: Change in energy level, Impaired concentration, Isolative, Loss of interest  Anxiety Symptoms: Generalized  Shannan Symptoms: No problems reported or observed.  Hallucinations: None  Delusions: No  Memory:Normal: No  Memory: Poor recent, Poor remote  Insight and Judgment: No  Insight and Judgment: Poor judgment, Poor insight      Suicide Risk CSSR-S:  1) Within the past month, have you wished you were dead or wished you could go to sleep and not wake up? : No  2) Have you actually had any thoughts of killing yourself? : No  6) Have you ever done anything, started to do anything, or prepared to do anything to end your life?: No      PLAN/TREATMENT RECOMMENDATIONS UPDATE: Patient will take medication as prescribed, eat 75% of meals, attend groups, participate in milieu activities, participate in treatment team and care planning for discharge and follow up.           Candice Willson RN

## 2025-03-31 NOTE — BH NOTE
Pt has been polite and mostly cooperative.  Med complaint and has eaten well for meals.  Laughs when asked about depression and anxiety and denies any SI/HI.  A&O to person only.  Hangs out around the exit door and needs constant redirection to move away from that door.  Pt did slip out of the door once today when transport was entering and was escorted back on the unit by stay from the [facility] area.  Encouraging pt to shower and has so far declined.   Is malodorous.

## 2025-03-31 NOTE — BH NOTE
JITENDRA rec'd fax re PASSR determination - \"Ruled out from Further PASSR Review...\" JITENDRA left message with [facility] [phone] regarding placement.    KAYLA Rodriguez

## 2025-03-31 NOTE — GROUP NOTE
Group Therapy Note    Date: 3/31/2025    Group Start Time: 1100  Group End Time: 1145  Group Topic: Activity    [facility]    Radha Figueroa LISW        Group Therapy Note    Patient participated in CBT activity for the thought, feeling, action cycle process.    Attendees: 3       Notes:  Patient was able to participate in activity. Patient was active listening.    Status After Intervention:  Unchanged    Participation Level: Active Listener    Participation Quality: Appropriate      Speech:  normal      Thought Process/Content: Logical      Affective Functioning: Congruent      Mood: euthymic      Level of consciousness:  Alert      Response to Learning: Able to verbalize/acknowledge new learning      Endings: None Reported    Modes of Intervention: Education, Support, Exploration, and Activity      Discipline Responsible: /Counselor      Signature:  FADI Arenas

## 2025-04-01 PROCEDURE — 6370000000 HC RX 637 (ALT 250 FOR IP): Performed by: PSYCHIATRY & NEUROLOGY

## 2025-04-01 PROCEDURE — 1240000000 HC EMOTIONAL WELLNESS R&B

## 2025-04-01 PROCEDURE — 6370000000 HC RX 637 (ALT 250 FOR IP)

## 2025-04-01 PROCEDURE — 99233 SBSQ HOSP IP/OBS HIGH 50: CPT

## 2025-04-01 RX ADMIN — QUETIAPINE FUMARATE 100 MG: 100 TABLET ORAL at 20:59

## 2025-04-01 RX ADMIN — TRAZODONE HYDROCHLORIDE 50 MG: 50 TABLET ORAL at 20:59

## 2025-04-01 RX ADMIN — Medication 10 MG: at 16:26

## 2025-04-01 RX ADMIN — ATORVASTATIN CALCIUM 20 MG: 10 TABLET, FILM COATED ORAL at 08:49

## 2025-04-01 RX ADMIN — SERTRALINE 150 MG: 100 TABLET, FILM COATED ORAL at 08:49

## 2025-04-01 RX ADMIN — QUETIAPINE FUMARATE 50 MG: 25 TABLET ORAL at 08:49

## 2025-04-01 RX ADMIN — DONEPEZIL HYDROCHLORIDE 10 MG: 5 TABLET, FILM COATED ORAL at 08:49

## 2025-04-01 ASSESSMENT — PAIN SCALES - GENERAL: PAINLEVEL_OUTOF10: 0

## 2025-04-01 NOTE — BH NOTE
Patient was awake and visible in milieu today. Exit seeking still present. Intrusiveness still present. Redirectable without issues. Patient still presents with short term memory issues. Took all meds, ate all meals and had a visitor this evening at meal time. No complaints voiced by patient.

## 2025-04-01 NOTE — GROUP NOTE
Group Therapy Note    Date: 4/1/2025    Group Start Time: 1045  Group End Time: 1130  Group Topic: Activity    [facility] MSW        Group Therapy Note  The clinician engaged the group members with a craft of making a clown for April Fools Day. They colored all of the pieces of the clown and clinician cut out the pieces as they glued them to the construction paper. Clinician engaged the group members with dad jokes while doing the activity. The group took up time on the second group as well.  Attendees: 4        Notes:  Patient was cooperative completing the activity and liked the end results of the project.     Status After Intervention:  Improved    Participation Level: Active Listener    Participation Quality: Appropriate      Speech:  normal      Thought Process/Content: Logical      Affective Functioning: Congruent      Mood: euthymic      Level of consciousness:  Alert      Response to Learning: Able to verbalize current knowledge/experience      Endings: None Reported    Modes of Intervention: Support, Socialization, and Activity      Discipline Responsible: /Counselor      Signature:  FAUSTINA Guido

## 2025-04-01 NOTE — GROUP NOTE
Group Therapy Note    Date: 4/1/2025    Group Start Time: 1330  Group End Time: 1415  Group Topic: Recreational    [facility]    Lakesha Gracia LPC    Group Documentation  The Group's theme for the day was \"fooling around\" in lieu to it being April fools day. The group engaged in an Madlibs activity, adding in missing pieces to create a unified group story.  The group activity was to encourage socialization and quick thinking. Despite the group size being small, the group had high cohesion.     Group Therapy Note    Attendees: 2      Notes:  Patient was present throughout the group. Patient engaged in discussion/activity and interacted with peers appropriately.   The patient was a little confused but had moments of lucidity.    Status After Intervention:  Unchanged    Participation Level: Active Listener and Interactive    Participation Quality: Appropriate      Speech:  normal      Thought Process/Content: Logical      Affective Functioning: Congruent      Mood: euthymic      Level of consciousness:  Alert      Response to Learning: Able to verbalize current knowledge/experience      Endings: None Reported    Modes of Intervention: Education and Activity      Discipline Responsible: /Counselor      Signature:  Lakesha Gracia LPC

## 2025-04-01 NOTE — GROUP NOTE
Group Therapy Note    Date: 4/1/2025    Group Start Time: 1000  Group End Time: 1045  Group Topic: Activity    [facility] Jeanette Gallagher MSW        Group Therapy Note  The clinician engaged the group members with a craft of making a clown for April Fools Day. They colored all of the pieces of the clown and clinician cut out the pieces as they glued them to the construction paper. Clinician engaged the group members with dad jokes while doing the activity.   Attendees: 4       Notes:  Patient was fully engaged in the activity coloring and making his clown. He had a great sense of humor and participated in the jokes.    Status After Intervention:  Improved    Participation Level: Active Listener and Interactive    Participation Quality: Appropriate, Attentive, Sharing, and Supportive      Speech:  normal      Thought Process/Content: Logical      Affective Functioning: Congruent      Mood: euthymic      Level of consciousness:  Alert      Response to Learning: Able to verbalize current knowledge/experience      Endings: None Reported    Modes of Intervention: Support, Socialization, and Activity      Discipline Responsible: /Counselor      Signature:  FAUSTINA Guido

## 2025-04-02 PROCEDURE — 6370000000 HC RX 637 (ALT 250 FOR IP)

## 2025-04-02 PROCEDURE — 6370000000 HC RX 637 (ALT 250 FOR IP): Performed by: PSYCHIATRY & NEUROLOGY

## 2025-04-02 PROCEDURE — 1240000000 HC EMOTIONAL WELLNESS R&B

## 2025-04-02 PROCEDURE — 99233 SBSQ HOSP IP/OBS HIGH 50: CPT

## 2025-04-02 RX ADMIN — SERTRALINE 150 MG: 100 TABLET, FILM COATED ORAL at 09:32

## 2025-04-02 RX ADMIN — QUETIAPINE FUMARATE 50 MG: 25 TABLET ORAL at 09:31

## 2025-04-02 RX ADMIN — QUETIAPINE FUMARATE 100 MG: 100 TABLET ORAL at 20:27

## 2025-04-02 RX ADMIN — DONEPEZIL HYDROCHLORIDE 10 MG: 5 TABLET, FILM COATED ORAL at 09:31

## 2025-04-02 RX ADMIN — TRAZODONE HYDROCHLORIDE 50 MG: 50 TABLET ORAL at 20:28

## 2025-04-02 RX ADMIN — ATORVASTATIN CALCIUM 20 MG: 10 TABLET, FILM COATED ORAL at 09:31

## 2025-04-02 RX ADMIN — Medication 10 MG: at 17:00

## 2025-04-02 ASSESSMENT — PAIN SCALES - GENERAL: PAINLEVEL_OUTOF10: 0

## 2025-04-02 NOTE — GROUP NOTE
Group Therapy Note    Date: 4/2/2025    Group Start Time: 1000  Group End Time: 1045  Group Topic: Activity    [facility]    Radah Figueroa LISW        Group Therapy Note    Patient was able to identify self care skills. Patient was able to participate in self care activity. Patients where able to reminisce about past.    Attendees: 4       Notes:  Patient was able to participate in group. Patient was able to identify self care.    Status After Intervention:  Improved    Participation Level: Active Listener and Interactive    Participation Quality: Appropriate      Speech:  normal      Thought Process/Content: Logical      Affective Functioning: Congruent      Mood: euthymic      Level of consciousness:  Alert      Response to Learning: Able to verbalize current knowledge/experience      Endings: None Reported    Modes of Intervention: Education, Support, Exploration, and Problem-solving      Discipline Responsible: /Counselor      Signature:  FADI Arenas

## 2025-04-02 NOTE — BH NOTE
Spencer has seemed to sleep fairly well through the night. He was up x1 to the bathroom, but seemed to return to sleep quickly. Since going to bed last night he has not been out of his room to try to elope.   Pt has been pleasant and cooperative with staff.

## 2025-04-02 NOTE — BH NOTE
.Bedside Mobility Assessment Tool (BMAT):     Assessment Level 1- Sit and Shake    1. From a semi-reclined position, ask patient to sit up and rotate to a seated position at the side of the bed. Can use the bedrail.    2. Ask patient to reach out and grab your hand and shake making sure patient reaches across his/her midline.   Pass- Patient is able to come to a seated position, maintain core strength. Maintains seated balance while reaching across midline. Move on to Assessment Level 2.     Assessment Level 2- Stretch and Point   1. With patient in seated position at the side of the bed, have patient place both feet on the floor (or stool) with knees no higher than hips.    2. Ask patient to stretch one leg and straighten the knee, then bend the ankle/flex and point the toes. If appropriate, repeat with the other leg.   Pass- Patient is able to demonstrate appropriate quad strength on intended weight bearing limb(s). Move onto Assessment Level 3.     Assessment Level 3- Stand   1. Ask patient to elevate off the bed or chair (seated to standing) using an assistive device (cane, bedrail).    2. Patient should be able to raise buttocks off be and hold for a count of five. May repeat once.   Pass- Patient maintains standing stability for at least 5 seconds, proceed to assessment level 4.    Assessment Level 4- Walk   1. Ask patient to march in place at bedside.    2. Then ask patient to advance step and return each foot. Some medical conditions may render a patient from stepping backwards, use your best clinical judgement.   Pass- Patient demonstrates balance while shifting weight and ability to step, takes independent steps, does not use assistive device patient is MOBILITY LEVEL 4.      Mobility Level- 4

## 2025-04-02 NOTE — BH NOTE
Alert to self. Confusion present. Patient visible on unit. Socializes well with peers. Out on the unit watching television. Participated in one group today. Isolative at times in room. Patient assisted to the shower. Needed redirection and cues to take shower. Patient refused to put clean pants on. Writer asked him to change his pants into the clean pair. Refused keeping dirty pants on. Writer asked why he didn't change pants, stated what's it matter We are all gonna die anyway. Patient left shower with odor present. Not certain if patient washed hair,used body wash or deodorant. Denies all.

## 2025-04-03 PROCEDURE — 1240000000 HC EMOTIONAL WELLNESS R&B

## 2025-04-03 PROCEDURE — 6370000000 HC RX 637 (ALT 250 FOR IP)

## 2025-04-03 PROCEDURE — 99233 SBSQ HOSP IP/OBS HIGH 50: CPT

## 2025-04-03 RX ADMIN — Medication 10 MG: at 18:05

## 2025-04-03 RX ADMIN — ATORVASTATIN CALCIUM 20 MG: 10 TABLET, FILM COATED ORAL at 10:23

## 2025-04-03 RX ADMIN — SERTRALINE 150 MG: 100 TABLET, FILM COATED ORAL at 10:22

## 2025-04-03 RX ADMIN — DONEPEZIL HYDROCHLORIDE 10 MG: 5 TABLET, FILM COATED ORAL at 10:22

## 2025-04-03 RX ADMIN — QUETIAPINE FUMARATE 50 MG: 25 TABLET ORAL at 10:22

## 2025-04-03 RX ADMIN — QUETIAPINE FUMARATE 100 MG: 100 TABLET ORAL at 20:13

## 2025-04-03 ASSESSMENT — PAIN SCALES - GENERAL: PAINLEVEL_OUTOF10: 0

## 2025-04-03 NOTE — GROUP NOTE
Group Therapy Note    Date: 4/3/2025    Group Start Time: 1330  Group End Time: 1415  Group Topic: Activity    [facility]    Radha Figueroa LISW        Group Therapy Note    Patient was able to participate in Cognitive Behavioral Therapy activity for thoughts,feelings, and actions.    Attendees: 9     Notes:  Patient refused to come to group due to sleeping.    Status After Intervention:  Unchanged    Participation Level: None    Participation Quality: Lethargic      Speech:  normal      Thought Process/Content: Logical      Affective Functioning: Congruent      Mood: euthymic      Level of consciousness:  Drowsy      Response to Learning: Resistant      Endings: None Reported    Modes of Intervention: Education, Support, Exploration, and Problem-solving      Discipline Responsible: /Counselor      Signature:  FADI Arenas

## 2025-04-04 PROCEDURE — 1240000000 HC EMOTIONAL WELLNESS R&B

## 2025-04-04 PROCEDURE — 6370000000 HC RX 637 (ALT 250 FOR IP): Performed by: PSYCHIATRY & NEUROLOGY

## 2025-04-04 PROCEDURE — 99233 SBSQ HOSP IP/OBS HIGH 50: CPT

## 2025-04-04 PROCEDURE — 6370000000 HC RX 637 (ALT 250 FOR IP)

## 2025-04-04 RX ADMIN — SERTRALINE 150 MG: 100 TABLET, FILM COATED ORAL at 08:39

## 2025-04-04 RX ADMIN — Medication 10 MG: at 17:11

## 2025-04-04 RX ADMIN — DONEPEZIL HYDROCHLORIDE 10 MG: 5 TABLET, FILM COATED ORAL at 08:39

## 2025-04-04 RX ADMIN — QUETIAPINE FUMARATE 50 MG: 25 TABLET ORAL at 08:39

## 2025-04-04 RX ADMIN — ATORVASTATIN CALCIUM 20 MG: 10 TABLET, FILM COATED ORAL at 08:39

## 2025-04-04 RX ADMIN — QUETIAPINE FUMARATE 100 MG: 100 TABLET ORAL at 20:15

## 2025-04-04 RX ADMIN — TRAZODONE HYDROCHLORIDE 50 MG: 50 TABLET ORAL at 20:15

## 2025-04-04 NOTE — BH NOTE
Patient continues to refused shower wife aware. Patient brought glasses to the desk in 2 pieces, wife Susana aware and reports that she will ring a different pair 4/5/2025. Wife reports that he often sleeps with his glasses in his pocket.Wife reports that patient would often refuse showers for 10-12 days and she would bribe him  with ice cream.

## 2025-04-04 NOTE — GROUP NOTE
Group Therapy Note    Date: 4/4/2025    Group Start Time: 1315  Group End Time: 1400  Group Topic: Activity    [facility] Mit[facility] Aldana        Group Therapy Note    Attendees: 8    Group members played 15 seconds of a theme song from the 70's and had to guess what show the song belonged to. The goal of group was to evoke memories, stimulate mental activity, promote social interaction, and improve well-being.     Notes:  Patient entered group late. Patient remained appropriate during time in group.     Status After Intervention:  Improved    Participation Level: Active Listener and Interactive    Participation Quality: Appropriate and Attentive      Speech:  normal      Thought Process/Content: Logical      Affective Functioning: Congruent      Mood: euthymic      Level of consciousness:  Alert      Response to Learning: Able to verbalize current knowledge/experience      Endings: None Reported    Modes of Intervention: Socialization, Exploration, and Activity      Discipline Responsible: Behavorial Health Tech      Signature:  FAUSTINA ANN

## 2025-04-05 PROCEDURE — 6370000000 HC RX 637 (ALT 250 FOR IP): Performed by: PSYCHIATRY & NEUROLOGY

## 2025-04-05 PROCEDURE — 6370000000 HC RX 637 (ALT 250 FOR IP)

## 2025-04-05 PROCEDURE — 1240000000 HC EMOTIONAL WELLNESS R&B

## 2025-04-05 RX ADMIN — QUETIAPINE FUMARATE 50 MG: 25 TABLET ORAL at 10:28

## 2025-04-05 RX ADMIN — ATORVASTATIN CALCIUM 20 MG: 10 TABLET, FILM COATED ORAL at 10:28

## 2025-04-05 RX ADMIN — DONEPEZIL HYDROCHLORIDE 10 MG: 5 TABLET, FILM COATED ORAL at 10:27

## 2025-04-05 RX ADMIN — TRAZODONE HYDROCHLORIDE 50 MG: 50 TABLET ORAL at 20:30

## 2025-04-05 RX ADMIN — QUETIAPINE FUMARATE 100 MG: 100 TABLET ORAL at 20:30

## 2025-04-05 RX ADMIN — Medication 10 MG: at 18:27

## 2025-04-05 RX ADMIN — SERTRALINE 150 MG: 100 TABLET, FILM COATED ORAL at 10:27

## 2025-04-06 PROCEDURE — 6370000000 HC RX 637 (ALT 250 FOR IP)

## 2025-04-06 PROCEDURE — 1240000000 HC EMOTIONAL WELLNESS R&B

## 2025-04-06 PROCEDURE — 99232 SBSQ HOSP IP/OBS MODERATE 35: CPT | Performed by: NURSE PRACTITIONER

## 2025-04-06 RX ADMIN — ATORVASTATIN CALCIUM 20 MG: 10 TABLET, FILM COATED ORAL at 12:45

## 2025-04-06 RX ADMIN — QUETIAPINE FUMARATE 100 MG: 100 TABLET ORAL at 20:38

## 2025-04-06 RX ADMIN — DONEPEZIL HYDROCHLORIDE 10 MG: 5 TABLET, FILM COATED ORAL at 12:44

## 2025-04-06 RX ADMIN — QUETIAPINE FUMARATE 50 MG: 25 TABLET ORAL at 12:44

## 2025-04-06 RX ADMIN — SERTRALINE 150 MG: 100 TABLET, FILM COATED ORAL at 12:44

## 2025-04-06 RX ADMIN — Medication 10 MG: at 17:43

## 2025-04-07 PROCEDURE — 6370000000 HC RX 637 (ALT 250 FOR IP): Performed by: PSYCHIATRY & NEUROLOGY

## 2025-04-07 PROCEDURE — 6370000000 HC RX 637 (ALT 250 FOR IP)

## 2025-04-07 PROCEDURE — 1240000000 HC EMOTIONAL WELLNESS R&B

## 2025-04-07 PROCEDURE — 99233 SBSQ HOSP IP/OBS HIGH 50: CPT

## 2025-04-07 RX ADMIN — SERTRALINE 150 MG: 100 TABLET, FILM COATED ORAL at 10:23

## 2025-04-07 RX ADMIN — Medication 10 MG: at 17:44

## 2025-04-07 RX ADMIN — QUETIAPINE FUMARATE 50 MG: 25 TABLET ORAL at 10:22

## 2025-04-07 RX ADMIN — QUETIAPINE FUMARATE 100 MG: 100 TABLET ORAL at 20:02

## 2025-04-07 RX ADMIN — DONEPEZIL HYDROCHLORIDE 10 MG: 5 TABLET, FILM COATED ORAL at 10:23

## 2025-04-07 RX ADMIN — TRAZODONE HYDROCHLORIDE 50 MG: 50 TABLET ORAL at 20:02

## 2025-04-07 RX ADMIN — TRAZODONE HYDROCHLORIDE 50 MG: 50 TABLET ORAL at 00:07

## 2025-04-07 RX ADMIN — ATORVASTATIN CALCIUM 20 MG: 10 TABLET, FILM COATED ORAL at 10:22

## 2025-04-07 ASSESSMENT — PAIN SCALES - GENERAL: PAINLEVEL_OUTOF10: 0

## 2025-04-07 NOTE — CARE COORDINATION
14:50 - Left vm message for pt's wife Susana requesting return call to provide update on pt's progress.         15:35 - Spoke with Elaina GARCIA in admissions with [facility] regarding  placement. They will review pt's updated clinical and come see him tomorrow to complete an on-site assessment.       15:55 - Spoke with pt's wife Susana and provide update on pt's progress and treatment plan. Also, discussed placement process.

## 2025-04-07 NOTE — BH NOTE
[facility]  Treatment Team Note  Review Date & Time: 4/07/2025  0900    Patient was not in treatment team      Status EXAM:   Mental Status and Behavioral Exam  Normal: No  Level of Assistance: Independent/Self  Facial Expression: Flat  Affect: Congruent  Level of Consciousness: Confused  Frequency of Checks: 4 times per hour, close  Mood:Normal: Yes  Mood: Empty  Motor Activity:Normal: Yes  Motor Activity: Decreased  Eye Contact: Good  Observed Behavior: Cooperative, Friendly  Sexual Misconduct History: Current - no  Preception: [place] to person  Attention:Normal: Yes  Attention: Distractible  Thought Processes: Blocking  Thought Content:Normal: No  Thought Content: Poverty of content  Depression Symptoms: No problems reported or observed.  Anxiety Symptoms: No problems reported or observed.  Shannan Symptoms: No problems reported or observed.  Hallucinations: None  Delusions: No  Delusions: Other (comment)  Memory:Normal: No  Memory: Poor recent, Poor remote  Insight and Judgment: No  Insight and Judgment: Poor judgment, Poor insight      Suicide Risk CSSR-S:  1) Within the past month, have you wished you were dead or wished you could go to sleep and not wake up? : No  2) Have you actually had any thoughts of killing yourself? : No  6) Have you ever done anything, started to do anything, or prepared to do anything to end your life?: No      PLAN/TREATMENT RECOMMENDATIONS UPDATE: Patient will take medication as prescribed, eat 75% of meals, attend groups, participate in milieu activities, participate in treatment team and care planning for discharge and follow up.          Ree Balderas RN

## 2025-04-08 PROCEDURE — 6370000000 HC RX 637 (ALT 250 FOR IP): Performed by: PSYCHIATRY & NEUROLOGY

## 2025-04-08 PROCEDURE — 1240000000 HC EMOTIONAL WELLNESS R&B

## 2025-04-08 PROCEDURE — 6370000000 HC RX 637 (ALT 250 FOR IP)

## 2025-04-08 PROCEDURE — 99233 SBSQ HOSP IP/OBS HIGH 50: CPT

## 2025-04-08 RX ADMIN — TRAZODONE HYDROCHLORIDE 50 MG: 50 TABLET ORAL at 20:26

## 2025-04-08 RX ADMIN — QUETIAPINE FUMARATE 100 MG: 100 TABLET ORAL at 20:26

## 2025-04-08 RX ADMIN — Medication 10 MG: at 17:29

## 2025-04-08 RX ADMIN — SERTRALINE 150 MG: 100 TABLET, FILM COATED ORAL at 11:26

## 2025-04-08 RX ADMIN — DONEPEZIL HYDROCHLORIDE 10 MG: 5 TABLET, FILM COATED ORAL at 11:25

## 2025-04-08 RX ADMIN — ATORVASTATIN CALCIUM 20 MG: 10 TABLET, FILM COATED ORAL at 11:24

## 2025-04-08 RX ADMIN — QUETIAPINE FUMARATE 50 MG: 25 TABLET ORAL at 11:25

## 2025-04-08 NOTE — CARE COORDINATION
Incoming secure voicemail message from Elaina with [facility]. They do not have a male memory care bed available for pt and the locations closest to pt's wife. They do have beds available at [facility]. If wife is open to placement there, Elaina can come do the onsite assessment with pt tomorrow morning at 10 am.         Left secure voicemail message for Elaina letting her know pt's wife stated yesterday during phone conversation that she was ok with a location a little farther away if needed. Also, let Elaina know the onsite assessment tomorrow morning is fine.

## 2025-04-09 PROCEDURE — 99233 SBSQ HOSP IP/OBS HIGH 50: CPT

## 2025-04-09 PROCEDURE — 6370000000 HC RX 637 (ALT 250 FOR IP)

## 2025-04-09 PROCEDURE — 1240000000 HC EMOTIONAL WELLNESS R&B

## 2025-04-09 PROCEDURE — 6370000000 HC RX 637 (ALT 250 FOR IP): Performed by: PSYCHIATRY & NEUROLOGY

## 2025-04-09 RX ADMIN — ATORVASTATIN CALCIUM 20 MG: 10 TABLET, FILM COATED ORAL at 08:29

## 2025-04-09 RX ADMIN — TRAZODONE HYDROCHLORIDE 50 MG: 50 TABLET ORAL at 19:54

## 2025-04-09 RX ADMIN — QUETIAPINE FUMARATE 100 MG: 100 TABLET ORAL at 19:54

## 2025-04-09 RX ADMIN — QUETIAPINE FUMARATE 50 MG: 25 TABLET ORAL at 08:28

## 2025-04-09 RX ADMIN — Medication 10 MG: at 17:55

## 2025-04-09 RX ADMIN — SERTRALINE 150 MG: 100 TABLET, FILM COATED ORAL at 08:28

## 2025-04-09 RX ADMIN — DONEPEZIL HYDROCHLORIDE 10 MG: 5 TABLET, FILM COATED ORAL at 08:28

## 2025-04-09 NOTE — CARE COORDINATION
Elaina with [facility] on the unit to complete the on-site assessment. She believes they will be able to accept pt at [facility]. They will reach out to pt's wife to get the rest of the info they need for admission decision.           15:45 - Spoke Andreas with [facility] stating they have been unable to reach pt's wife today and asked for assistance contacting her for the remaining information.           16:10 - Left vm message for pt's wife asking her to contact Andreas with [facility] and provided the contact info.

## 2025-04-10 PROCEDURE — 1240000000 HC EMOTIONAL WELLNESS R&B

## 2025-04-10 PROCEDURE — 99233 SBSQ HOSP IP/OBS HIGH 50: CPT

## 2025-04-10 PROCEDURE — 6370000000 HC RX 637 (ALT 250 FOR IP)

## 2025-04-10 PROCEDURE — 6370000000 HC RX 637 (ALT 250 FOR IP): Performed by: PSYCHIATRY & NEUROLOGY

## 2025-04-10 RX ADMIN — DONEPEZIL HYDROCHLORIDE 10 MG: 5 TABLET, FILM COATED ORAL at 11:44

## 2025-04-10 RX ADMIN — QUETIAPINE FUMARATE 50 MG: 25 TABLET ORAL at 11:44

## 2025-04-10 RX ADMIN — SERTRALINE 150 MG: 100 TABLET, FILM COATED ORAL at 11:43

## 2025-04-10 RX ADMIN — ATORVASTATIN CALCIUM 20 MG: 10 TABLET, FILM COATED ORAL at 11:43

## 2025-04-10 RX ADMIN — TRAZODONE HYDROCHLORIDE 50 MG: 50 TABLET ORAL at 20:09

## 2025-04-10 RX ADMIN — QUETIAPINE FUMARATE 100 MG: 100 TABLET ORAL at 20:09

## 2025-04-10 RX ADMIN — Medication 10 MG: at 17:40

## 2025-04-10 NOTE — CARE COORDINATION
Incoming call from Andreas from [facility]. He did speak to pt's wife yesterday and got the information they need. They can accept pt as soon as wife makes the first payment. They will coordinate payment with wife in the hopes of accepting pt tomorrow. Andreas will call back after speaking with wife to confirm admission date.
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
## 2025-04-10 NOTE — GROUP NOTE
Group Therapy Note    Date: 4/10/2025    Group Start Time: 1330  Group End Time: 1415  Group Topic: Activity    [facility]    Radha Figueroa LISW        Group Therapy Note    Patient was able to talk about communication skills. Patient was able to participate in activity.    Attendees: 9    Notes:  Patient refused to come to group due to sleeping.    Status After Intervention:  Unchanged    Participation Level: None    Participation Quality: Lethargic      Speech:  normal      Thought Process/Content: Logical      Affective Functioning: Congruent      Mood: euthymic      Level of consciousness:  Alert      Response to Learning: Resistant      Endings: None Reported    Modes of Intervention: Education, Support, Exploration, and Problem-solving      Discipline Responsible: /Counselor      Signature:  FADI Arenas

## 2025-04-10 NOTE — DISCHARGE INSTR - COC
Continuity of Care Form    Patient Name: Spencer Ingram   :  1955  MRN:  [id number]    Admit date:  3/24/2025  Discharge date:  ***    Code Status Order: Full Code   Advance Directives:     Admitting Physician:  Estuardo Guy MD  PCP: Doroteo Jha MD    Discharging Nurse: Candice Willson  Discharging Hospital Unit/Room#: 2207/2207-01  Discharging Unit Phone Number: [phone]    Emergency Contact:   Extended Emergency Contact Information  Primary Emergency Contact: BRIELEL INGRAM  Home Phone: [phone]  Mobile Phone: [phone]  Relation: Spouse  Secondary Emergency Contact: Deng Ingram  Mobile Phone: [phone]  Relation: Child  Preferred language: English    Past Surgical History:  History reviewed. No pertinent surgical history.    Immunization History:   Immunization History   Administered Date(s) Administered    COVID-19, MODERNA Booster BLUE border, (age 18y+), IM, 50mcg/0.25mL 2021       Active Problems:  Patient Active Problem List   Diagnosis Code    B12 deficiency E53.8    Dementia in Alzheimer's disease (HCC) G30.9, F02.80    Elevated PSA, less than 10 ng/ml R97.20    Mild episode of recurrent major depressive disorder F33.0    Hypertriglyceridemia E78.1    NOEMI (obstructive sleep apnea) G47.33    Poor short-term memory R41.3    Postural tremor G25.2    Prediabetes R73.03    Subclinical hypothyroidism E03.8    Vitamin D deficiency E55.9    Dementia with behavioral disturbance (HCC) F03.918       Isolation/Infection:   Isolation            No Isolation          Patient Infection Status    None to display              Nurse Assessment:  Last Vital Signs: /66   Pulse 63   Temp 97.7 °F (36.5 °C) (Oral)   Resp 16   Ht 1.829 m (6' 0.01\")   Wt 88.2 kg (194 lb 7.1 oz)   SpO2 100%   BMI 26.37 kg/m²     Last documented pain score (0-10 scale): Pain Level: 0  Last Weight:   Wt Readings from Last 1 Encounters:   25 88.2 kg (194 lb 7.1 oz)     Mental Status:

## 2025-04-11 PROCEDURE — 6370000000 HC RX 637 (ALT 250 FOR IP)

## 2025-04-11 PROCEDURE — 1240000000 HC EMOTIONAL WELLNESS R&B

## 2025-04-11 PROCEDURE — 99233 SBSQ HOSP IP/OBS HIGH 50: CPT

## 2025-04-11 RX ADMIN — Medication 10 MG: at 18:33

## 2025-04-11 RX ADMIN — SERTRALINE 150 MG: 100 TABLET, FILM COATED ORAL at 09:08

## 2025-04-11 RX ADMIN — DONEPEZIL HYDROCHLORIDE 10 MG: 5 TABLET, FILM COATED ORAL at 09:08

## 2025-04-11 RX ADMIN — QUETIAPINE FUMARATE 50 MG: 25 TABLET ORAL at 09:09

## 2025-04-11 RX ADMIN — ATORVASTATIN CALCIUM 20 MG: 10 TABLET, FILM COATED ORAL at 09:09

## 2025-04-11 RX ADMIN — QUETIAPINE FUMARATE 100 MG: 100 TABLET ORAL at 20:14

## 2025-04-12 PROCEDURE — 6370000000 HC RX 637 (ALT 250 FOR IP): Performed by: PSYCHIATRY & NEUROLOGY

## 2025-04-12 PROCEDURE — 1240000000 HC EMOTIONAL WELLNESS R&B

## 2025-04-12 PROCEDURE — 6370000000 HC RX 637 (ALT 250 FOR IP)

## 2025-04-12 PROCEDURE — 99233 SBSQ HOSP IP/OBS HIGH 50: CPT | Performed by: PSYCHIATRY & NEUROLOGY

## 2025-04-12 RX ADMIN — Medication 10 MG: at 17:20

## 2025-04-12 RX ADMIN — TRAZODONE HYDROCHLORIDE 50 MG: 50 TABLET ORAL at 20:20

## 2025-04-12 RX ADMIN — DONEPEZIL HYDROCHLORIDE 10 MG: 5 TABLET, FILM COATED ORAL at 08:15

## 2025-04-12 RX ADMIN — SERTRALINE 150 MG: 100 TABLET, FILM COATED ORAL at 08:15

## 2025-04-12 RX ADMIN — QUETIAPINE FUMARATE 100 MG: 100 TABLET ORAL at 20:19

## 2025-04-12 RX ADMIN — QUETIAPINE FUMARATE 50 MG: 25 TABLET ORAL at 08:15

## 2025-04-12 RX ADMIN — ATORVASTATIN CALCIUM 20 MG: 10 TABLET, FILM COATED ORAL at 08:16

## 2025-04-13 PROCEDURE — 1240000000 HC EMOTIONAL WELLNESS R&B

## 2025-04-13 PROCEDURE — 6370000000 HC RX 637 (ALT 250 FOR IP)

## 2025-04-13 PROCEDURE — 6370000000 HC RX 637 (ALT 250 FOR IP): Performed by: PSYCHIATRY & NEUROLOGY

## 2025-04-13 RX ADMIN — Medication 10 MG: at 17:14

## 2025-04-13 RX ADMIN — QUETIAPINE FUMARATE 100 MG: 100 TABLET ORAL at 20:05

## 2025-04-13 RX ADMIN — TRAZODONE HYDROCHLORIDE 50 MG: 50 TABLET ORAL at 20:05

## 2025-04-13 RX ADMIN — SERTRALINE 150 MG: 100 TABLET, FILM COATED ORAL at 08:55

## 2025-04-13 RX ADMIN — QUETIAPINE FUMARATE 50 MG: 25 TABLET ORAL at 08:54

## 2025-04-13 RX ADMIN — ATORVASTATIN CALCIUM 20 MG: 10 TABLET, FILM COATED ORAL at 08:55

## 2025-04-13 RX ADMIN — DONEPEZIL HYDROCHLORIDE 10 MG: 5 TABLET, FILM COATED ORAL at 08:54

## 2025-04-14 PROCEDURE — 1240000000 HC EMOTIONAL WELLNESS R&B

## 2025-04-14 PROCEDURE — 6370000000 HC RX 637 (ALT 250 FOR IP): Performed by: PSYCHIATRY & NEUROLOGY

## 2025-04-14 PROCEDURE — 6370000000 HC RX 637 (ALT 250 FOR IP)

## 2025-04-14 PROCEDURE — 99233 SBSQ HOSP IP/OBS HIGH 50: CPT | Performed by: PSYCHIATRY & NEUROLOGY

## 2025-04-14 RX ADMIN — QUETIAPINE FUMARATE 50 MG: 25 TABLET ORAL at 12:19

## 2025-04-14 RX ADMIN — TRAZODONE HYDROCHLORIDE 50 MG: 50 TABLET ORAL at 22:39

## 2025-04-14 RX ADMIN — SERTRALINE 150 MG: 100 TABLET, FILM COATED ORAL at 12:19

## 2025-04-14 RX ADMIN — QUETIAPINE FUMARATE 100 MG: 100 TABLET ORAL at 20:52

## 2025-04-14 RX ADMIN — ATORVASTATIN CALCIUM 20 MG: 10 TABLET, FILM COATED ORAL at 12:20

## 2025-04-14 RX ADMIN — Medication 10 MG: at 18:20

## 2025-04-14 RX ADMIN — DONEPEZIL HYDROCHLORIDE 10 MG: 5 TABLET, FILM COATED ORAL at 12:19

## 2025-04-14 NOTE — CARE COORDINATION
Spoke with Andreas with [facility] to obtain an update on placement. Andreas still has not been able to reach wife to schedule the initial payment needed before they can accept pt.           17:35 - Attempted to reach pt's wife to discuss placement. Call rang several times but was never answered.

## 2025-04-14 NOTE — PROGRESS NOTES
Behavioral Services                                              Medicare Re-Certification    I certify that the inpatient psychiatric hospital services furnished since the previous certification/re-certification were, and continue to be, medically necessary for;    [x] (1) Treatment which could reasonably be expected to improve the patient's condition,    [x] (2) Or for diagnostic study.    Estimated length of stay/service 2-5 days    Plan for post-hospital care LTC    This patient continues to need, on a daily basis, active treatment furnished directly by or requiring the supervision of inpatient psychiatric personnel.    Electronically signed by SUSY Gan CNP on 4/8/2025 at 11:49 AM   
      Behavioral Services                                              Medicare Re-Certification    I certify that the inpatient psychiatric hospital services furnished since the previous certification/re-certification were, and continue to be, medically necessary for;    [x] (1) Treatment which could reasonably be expected to improve the patient's condition,    [x] (2) Or for diagnostic study.    Estimated length of stay/service 2-5 days    Plan for post-hospital care outpatient support    This patient continues to need, on a daily basis, active treatment furnished directly by or requiring the supervision of inpatient psychiatric personnel.    Electronically signed by SUSY Gan CNP on 3/31/2025 at 11:44 AM   
      Behavioral Services  Medicare Certification Upon Admission    I certify that this patient's inpatient psychiatric hospital admission is medically necessary for:    [x] (1) Treatment which could reasonably be expected to improve this patient's condition,       [x] (2) Or for diagnostic study;     AND     [x](2) The inpatient psychiatric services are provided while the individual is under the care of a physician and are included in the individualized plan of care.    Estimated length of stay/service 2-5 days    Plan for post-hospital care outpatient support    Electronically signed by SUSY Gan CNP on 3/24/2025 at 10:44 AM      
2009, Trazodone 50mg given as PRN for sleep, to continue to monitor at this time.   
Alert to self. Confusion present with patient hard to understand at times due to mumbling. Patient has been cooperative with care. Took medication without difficulty. Up walking on unit at times with gait unsteady. 1:1 continues with standby assist for safety. Fall precautions in place for safety. Gripper socks intact to bilateral feet. Patient has had no episodes of aggressive behavior this shift. Redirection easily with cues. Patient has been incontinent of bowel and bladder this shift. Appetite good with fluids taken well.  
Attempted to call wife x 2, call was sent to a voicemail.left a voicemail requesting a return call. Plan to inform spouse of patient's fall.  
Bedside Mobility Assessment Tool (BMAT):     Assessment Level 1- Sit and Shake    1. From a semi-reclined position, ask patient to sit up and rotate to a seated position at the side of the bed. Can use the bedrail.    2. Ask patient to reach out and grab your hand and shake making sure patient reaches across his/her midline.   Pass- Patient is able to come to a seated position, maintain core strength. Maintains seated balance while reaching across midline. Move on to Assessment Level 2.     Assessment Level 2- Stretch and Point   1. With patient in seated position at the side of the bed, have patient place both feet on the floor (or stool) with knees no higher than hips.    2. Ask patient to stretch one leg and straighten the knee, then bend the ankle/flex and point the toes. If appropriate, repeat with the other leg.   Pass- Patient is able to demonstrate appropriate quad strength on intended weight bearing limb(s). Move onto Assessment Level 3.     Assessment Level 3- Stand   1. Ask patient to elevate off the bed or chair (seated to standing) using an assistive device (cane, bedrail).    2. Patient should be able to raise buttocks off be and hold for a count of five. May repeat once.   Pass- Patient maintains standing stability for at least 5 seconds, proceed to assessment level 4.    Assessment Level 4- Walk   1. Ask patient to march in place at bedside.    2. Then ask patient to advance step and return each foot. Some medical conditions may render a patient from stepping backwards, use your best clinical judgement.   Fail- Patient not able to complete tasks OR requires use of assistive device. Patient is MOBILITY LEVEL 3.       Mobility Level- 3  
Bedside Mobility Assessment Tool (BMAT):     Assessment Level 1- Sit and Shake    1. From a semi-reclined position, ask patient to sit up and rotate to a seated position at the side of the bed. Can use the bedrail.    2. Ask patient to reach out and grab your hand and shake making sure patient reaches across his/her midline.   Pass- Patient is able to come to a seated position, maintain core strength. Maintains seated balance while reaching across midline. Move on to Assessment Level 2.     Assessment Level 2- Stretch and Point   1. With patient in seated position at the side of the bed, have patient place both feet on the floor (or stool) with knees no higher than hips.    2. Ask patient to stretch one leg and straighten the knee, then bend the ankle/flex and point the toes. If appropriate, repeat with the other leg.   Pass- Patient is able to demonstrate appropriate quad strength on intended weight bearing limb(s). Move onto Assessment Level 3.     Assessment Level 3- Stand   1. Ask patient to elevate off the bed or chair (seated to standing) using an assistive device (cane, bedrail).    2. Patient should be able to raise buttocks off be and hold for a count of five. May repeat once.   Pass- Patient maintains standing stability for at least 5 seconds, proceed to assessment level 4.    Assessment Level 4- Walk   1. Ask patient to march in place at bedside.    2. Then ask patient to advance step and return each foot. Some medical conditions may render a patient from stepping backwards, use your best clinical judgement.   Fail- Patient not able to complete tasks OR requires use of assistive device. Patient is MOBILITY LEVEL 3.       Mobility Level- 3   
Bedside Mobility Assessment Tool (BMAT):     Assessment Level 1- Sit and Shake    1. From a semi-reclined position, ask patient to sit up and rotate to a seated position at the side of the bed. Can use the bedrail.    2. Ask patient to reach out and grab your hand and shake making sure patient reaches across his/her midline.   Pass- Patient is able to come to a seated position, maintain core strength. Maintains seated balance while reaching across midline. Move on to Assessment Level 2.     Assessment Level 2- Stretch and Point   1. With patient in seated position at the side of the bed, have patient place both feet on the floor (or stool) with knees no higher than hips.    2. Ask patient to stretch one leg and straighten the knee, then bend the ankle/flex and point the toes. If appropriate, repeat with the other leg.   Pass- Patient is able to demonstrate appropriate quad strength on intended weight bearing limb(s). Move onto Assessment Level 3.     Assessment Level 3- Stand   1. Ask patient to elevate off the bed or chair (seated to standing) using an assistive device (cane, bedrail).    2. Patient should be able to raise buttocks off be and hold for a count of five. May repeat once.   Pass- Patient maintains standing stability for at least 5 seconds, proceed to assessment level 4.    Assessment Level 4- Walk   1. Ask patient to march in place at bedside.    2. Then ask patient to advance step and return each foot. Some medical conditions may render a patient from stepping backwards, use your best clinical judgement.   Pass- Patient demonstrates balance while shifting weight and ability to step, takes independent steps, does not use assistive device patient is MOBILITY LEVEL 4.      Mobility Level- 4  
Department of Psychiatry  Attending Progress Note  Chief Complaint: Dementia with Behavioral Disturbance    Patient's chart was reviewed and collaborated with  about the treatment plan.    SUBJECTIVE:    Pt alert, oriented to self only today.  Up walking on the unit. Checking doors, but easily re-directed.  Pt pleasant in conversation, making some jokes.  Confused and confabulatory at times.  Waiting on LTC     Plan  3/24:  Resume medications - Zoloft 150 mg daily, Seroquel 50 mg BID (started 3/23/25), Aricept 10 mg daily.  3/25:  Increase HS Seroquel to 100 mg, Start Melatonin 10 mg with dinner for sundowning    Patient is feeling unchanged. Suicidal ideation:  denies suicidal ideation.  Patient does not have medication side effects.    ROS: Patient has new complaints: no  Sleeping adequately:  Yes   Appetite adequate: Yes  Attending groups: No:   Visitors:No    OBJECTIVE    Physical  VITALS:  /70   Pulse 68   Temp 98.7 °F (37.1 °C) (Temporal)   Resp 18   Ht 1.829 m (6' 0.01\")   Wt 88.2 kg (194 lb 7.1 oz)   SpO2 99%   BMI 26.37 kg/m²     Mental Status Examination:  Patients appearance was ill-appearing, hospital attire, and lying in bed. Thoughts are Illogical. Homicidal ideations none.  No abnormal movements, tics or mannerisms.  Memory impaired recent memory and remote memory Aims 0. Concentration Poor.   Alert and oriented X 4. Insight and Judgement impaired insight. Patient was cooperative. Patient gait Unsteady, 1:1. Mood decreased range, affect flat affect Hallucinations Absent, suicidal ideations no specific plan to harm self Speech normal pitch and normal volume    Data  Labs:   No visits with results within 2 Day(s) from this visit.   Latest known visit with results is:   Admission on 03/22/2025, Discharged on 03/24/2025   Component Date Value Ref Range Status    WBC 03/23/2025 7.9  4.0 - 11.0 K/uL Final    RBC 03/23/2025 4.42  4.20 - 5.90 M/uL Final    Hemoglobin 03/23/2025 13.1 
Department of Psychiatry  Attending Progress Note  Chief Complaint: Dementia with Behavioral Disturbance    Patient's chart was reviewed and collaborated with  about the treatment plan.    SUBJECTIVE:    Pt sitting in his room today.  Showered with staff support, witnessed fall.  Pt did not discuss this, he denies any pain.  Pt alert, oriented to self only.  Pleasant in conversation, able to joke and laugh.  Compliant with care    Plan  3/24:  Resume medications - Zoloft 150 mg daily, Seroquel 50 mg BID (started 3/23/25), Aricept 10 mg daily.  3/25:  Increase HS Seroquel to 100 mg, Start Melatonin 10 mg with dinner for sundowning    Patient is feeling unchanged. Suicidal ideation:  denies suicidal ideation.  Patient does not have medication side effects.    ROS: Patient has new complaints: no  Sleeping adequately:  Yes   Appetite adequate: Yes  Attending groups: No:   Visitors:No    OBJECTIVE    Physical  VITALS:  BP (!) 141/75   Pulse 74   Temp 96.9 °F (36.1 °C) (Temporal)   Resp 18   Ht 1.829 m (6' 0.01\")   Wt 88.2 kg (194 lb 7.1 oz)   SpO2 99%   BMI 26.37 kg/m²     Mental Status Examination:  Patients appearance was ill-appearing, hospital attire, and lying in bed. Thoughts are Illogical. Homicidal ideations none.  No abnormal movements, tics or mannerisms.  Memory impaired recent memory and remote memory Aims 0. Concentration Poor.   Alert and oriented X 4. Insight and Judgement impaired insight. Patient was cooperative. Patient gait Unsteady, 1:1. Mood decreased range, affect flat affect Hallucinations Absent, suicidal ideations no specific plan to harm self Speech normal pitch and normal volume    Data  Labs:   No visits with results within 2 Day(s) from this visit.   Latest known visit with results is:   Admission on 03/22/2025, Discharged on 03/24/2025   Component Date Value Ref Range Status    WBC 03/23/2025 7.9  4.0 - 11.0 K/uL Final    RBC 03/23/2025 4.42  4.20 - 5.90 M/uL Final    
Department of Psychiatry  Attending Progress Note  Chief Complaint: Dementia with Behavioral Disturbance    Patient's chart was reviewed and collaborated with  about the treatment plan.    SUBJECTIVE:    Pt up on the unit.  Alert, oriented to self only.  Pt concerned about his name on the wall near his room.  He wants to know how long it has been there.  Pt considering bringing his father here who is a , wants to ensure it is safe first.  Continues to walk on unit, checking doors.  Confused, confabulatory, behaviorally stable.         Plan  3/24:  Resume medications - Zoloft 150 mg daily, Seroquel 50 mg BID (started 3/23/25), Aricept 10 mg daily.  3/25:  Increase HS Seroquel to 100 mg, Start Melatonin 10 mg with dinner for sundowning    Patient is feeling unchanged. Suicidal ideation:  denies suicidal ideation.  Patient does not have medication side effects.    ROS: Patient has new complaints: no  Sleeping adequately:  Yes   Appetite adequate: Yes  Attending groups: No:   Visitors:No    OBJECTIVE    Physical  VITALS:  BP (!) 156/72   Pulse 70   Temp 97.7 °F (36.5 °C) (Temporal)   Resp 16   Ht 1.829 m (6' 0.01\")   Wt 88.2 kg (194 lb 7.1 oz)   SpO2 98%   BMI 26.37 kg/m²     Mental Status Examination:  Patients appearance was ill-appearing, hospital attire, and lying in bed. Thoughts are Illogical. Homicidal ideations none.  No abnormal movements, tics or mannerisms.  Memory impaired recent memory and remote memory Aims 0. Concentration Poor.   Alert and oriented X 4. Insight and Judgement impaired insight. Patient was cooperative. Patient gait Unsteady, 1:1. Mood decreased range, affect flat affect Hallucinations Absent, suicidal ideations no specific plan to harm self Speech normal pitch and normal volume    Data  Labs:   No visits with results within 2 Day(s) from this visit.   Latest known visit with results is:   Admission on 03/22/2025, Discharged on 03/24/2025   Component Date 
Department of Psychiatry  Attending Progress Note  Chief Complaint: Dementia with Behavioral Disturbance    Patient's chart was reviewed and collaborated with  about the treatment plan.    SUBJECTIVE:    Pt up on unit today.  Alert, oriented to self only.  Pt asked if he was going to get a shower today, he states no, planned to leave and head home.  Pt confused, confabulatory.  Behaviorally stable.      Plan  3/24:  Resume medications - Zoloft 150 mg daily, Seroquel 50 mg BID (started 3/23/25), Aricept 10 mg daily.  3/25:  Increase HS Seroquel to 100 mg, Start Melatonin 10 mg with dinner for sundowning    Patient is feeling unchanged. Suicidal ideation:  denies suicidal ideation.  Patient does not have medication side effects.    ROS: Patient has new complaints: no  Sleeping adequately:  Yes   Appetite adequate: Yes  Attending groups: No:   Visitors:No    OBJECTIVE    Physical  VITALS:  BP (!) 112/56   Pulse 67   Temp 98 °F (36.7 °C) (Temporal)   Resp 16   Ht 1.829 m (6' 0.01\")   Wt 88.2 kg (194 lb 7.1 oz)   SpO2 97%   BMI 26.37 kg/m²     Mental Status Examination:  Patients appearance was ill-appearing, hospital attire, and lying in bed. Thoughts are Illogical. Homicidal ideations none.  No abnormal movements, tics or mannerisms.  Memory impaired recent memory and remote memory Aims 0. Concentration Poor.   Alert and oriented X 4. Insight and Judgement impaired insight. Patient was cooperative. Patient gait Unsteady, 1:1. Mood decreased range, affect flat affect Hallucinations Absent, suicidal ideations no specific plan to harm self Speech normal pitch and normal volume    Data  Labs:   No visits with results within 2 Day(s) from this visit.   Latest known visit with results is:   Admission on 03/22/2025, Discharged on 03/24/2025   Component Date Value Ref Range Status    WBC 03/23/2025 7.9  4.0 - 11.0 K/uL Final    RBC 03/23/2025 4.42  4.20 - 5.90 M/uL Final    Hemoglobin 03/23/2025 13.1 (L)  
Department of Psychiatry  Attending Progress Note  Chief Complaint: Dementia with Behavioral Disturbance    Patient's chart was reviewed and collaborated with  about the treatment plan.    SUBJECTIVE:    Pt up, walking on the unit today.  Pt alert, oriented to self only.  Pt remains confused, confabulatory at times.  He is pleasant and cooperative in conversation.  Wanders on unit, often into other rooms, easily redirected.      Plan  3/24:  Resume medications - Zoloft 150 mg daily, Seroquel 50 mg BID (started 3/23/25), Aricept 10 mg daily.  3/25:  Increase HS Seroquel to 100 mg, Start Melatonin 10 mg with dinner for sundowning    Patient is feeling unchanged. Suicidal ideation:  denies suicidal ideation.  Patient does not have medication side effects.    ROS: Patient has new complaints: no  Sleeping adequately:  Yes   Appetite adequate: Yes  Attending groups: No:   Visitors:No    OBJECTIVE    Physical  VITALS:  /73   Pulse 70   Temp 97.3 °F (36.3 °C) (Oral)   Resp 16   Ht 1.829 m (6' 0.01\")   Wt 88.2 kg (194 lb 7.1 oz)   SpO2 100%   BMI 26.37 kg/m²     Mental Status Examination:  Patients appearance was ill-appearing, hospital attire, and lying in bed. Thoughts are Illogical. Homicidal ideations none.  No abnormal movements, tics or mannerisms.  Memory impaired recent memory and remote memory Aims 0. Concentration Poor.   Alert and oriented X 4. Insight and Judgement impaired insight. Patient was cooperative. Patient gait Unsteady, 1:1. Mood decreased range, affect flat affect Hallucinations Absent, suicidal ideations no specific plan to harm self Speech normal pitch and normal volume    Data  Labs:   No visits with results within 2 Day(s) from this visit.   Latest known visit with results is:   Admission on 03/22/2025, Discharged on 03/24/2025   Component Date Value Ref Range Status    WBC 03/23/2025 7.9  4.0 - 11.0 K/uL Final    RBC 03/23/2025 4.42  4.20 - 5.90 M/uL Final    Hemoglobin 
Department of Psychiatry  Attending Progress Note  Chief Complaint: Dementia with Behavioral Disturbance    Patient's chart was reviewed and collaborated with  about the treatment plan.    SUBJECTIVE:    Resting in bed today.  Alert, oriented to self only.  Behaviorally stable at this time.  Needs assistance with ADLs, constant redirection.    Plan  3/24:  Resume medications - Zoloft 150 mg daily, Seroquel 50 mg BID (started 3/23/25), Aricept 10 mg daily.  3/25:  Increase HS Seroquel to 100 mg, Start Melatonin 10 mg with dinner for sundowning    Patient is feeling unchanged. Suicidal ideation:  denies suicidal ideation.  Patient does not have medication side effects.    ROS: Patient has new complaints: no  Sleeping adequately:  Yes   Appetite adequate: Yes  Attending groups: No:   Visitors:No    OBJECTIVE    Physical  VITALS:  BP (!) 146/67   Pulse 71   Temp 98.1 °F (36.7 °C) (Oral)   Resp 16   Ht 1.829 m (6' 0.01\")   Wt 88.2 kg (194 lb 7.1 oz)   SpO2 99%   BMI 26.37 kg/m²     Mental Status Examination:  Patients appearance was ill-appearing, hospital attire, and lying in bed. Thoughts are Illogical. Homicidal ideations none.  No abnormal movements, tics or mannerisms.  Memory impaired recent memory and remote memory Aims 0. Concentration Poor.   Alert and oriented X 4. Insight and Judgement impaired insight. Patient was cooperative. Patient gait Unsteady, 1:1. Mood decreased range, affect flat affect Hallucinations Absent, suicidal ideations no specific plan to harm self Speech normal pitch and normal volume    Data  Labs:   No visits with results within 2 Day(s) from this visit.   Latest known visit with results is:   Admission on 03/22/2025, Discharged on 03/24/2025   Component Date Value Ref Range Status    WBC 03/23/2025 7.9  4.0 - 11.0 K/uL Final    RBC 03/23/2025 4.42  4.20 - 5.90 M/uL Final    Hemoglobin 03/23/2025 13.1 (L)  13.5 - 17.5 g/dL Final    Hematocrit 03/23/2025 39.4 (L)  40.5 - 
Department of Psychiatry  Attending Progress Note  Chief Complaint: Dementia with Behavioral Disturbance    Patient's chart was reviewed and collaborated with  about the treatment plan.    SUBJECTIVE:   Pt resting in bed after lunch.  He could not remember what he had.  Pleasant and cooperative in conversation, able to tell a few jokes as we spoke.  Oriented to self only.  Behaviorally stable.      Plan  3/24:  Resume medications - Zoloft 150 mg daily, Seroquel 50 mg BID (started 3/23/25), Aricept 10 mg daily.  3/25:  Increase HS Seroquel to 100 mg, Start Melatonin 10 mg with dinner for sundowning    Patient is feeling unchanged. Suicidal ideation:  denies suicidal ideation.  Patient does not have medication side effects.    ROS: Patient has new complaints: no  Sleeping adequately:  Yes   Appetite adequate: Yes  Attending groups: No:   Visitors:No    OBJECTIVE    Physical  VITALS:  /67   Pulse 62   Temp 98.1 °F (36.7 °C) (Temporal)   Resp 18   Ht 1.829 m (6' 0.01\")   Wt 88.2 kg (194 lb 7.1 oz)   SpO2 97%   BMI 26.37 kg/m²     Mental Status Examination:  Patients appearance was ill-appearing, hospital attire, and lying in bed. Thoughts are Illogical. Homicidal ideations none.  No abnormal movements, tics or mannerisms.  Memory impaired recent memory and remote memory Aims 0. Concentration Poor.   Alert and oriented X 4. Insight and Judgement impaired insight. Patient was cooperative. Patient gait Unsteady, 1:1. Mood decreased range, affect flat affect Hallucinations Absent, suicidal ideations no specific plan to harm self Speech normal pitch and normal volume    Data  Labs:   No visits with results within 2 Day(s) from this visit.   Latest known visit with results is:   Admission on 03/22/2025, Discharged on 03/24/2025   Component Date Value Ref Range Status    WBC 03/23/2025 7.9  4.0 - 11.0 K/uL Final    RBC 03/23/2025 4.42  4.20 - 5.90 M/uL Final    Hemoglobin 03/23/2025 13.1 (L)  13.5 - 
Department of Psychiatry  Attending Progress Note  Chief Complaint: Dementia with Behavioral Disturbance    Patient's chart was reviewed and collaborated with  about the treatment plan.    SUBJECTIVE:   Pt up on the unit eating lunch today.  Pt alert, oriented to self only  Pt wanders on unit, sometimes into others room, easily redirected.  Pleasant and cooperative in conversation.      Plan  3/24:  Resume medications - Zoloft 150 mg daily, Seroquel 50 mg BID (started 3/23/25), Aricept 10 mg daily.  3/25:  Increase HS Seroquel to 100 mg, Start Melatonin 10 mg with dinner for sundowning    Patient is feeling unchanged. Suicidal ideation:  denies suicidal ideation.  Patient does not have medication side effects.    ROS: Patient has new complaints: no  Sleeping adequately:  Yes   Appetite adequate: Yes  Attending groups: No:   Visitors:No    OBJECTIVE    Physical  VITALS:  /66   Pulse 64   Temp 97.5 °F (36.4 °C) (Temporal)   Resp 16   Ht 1.829 m (6' 0.01\")   Wt 88.2 kg (194 lb 7.1 oz)   SpO2 97%   BMI 26.37 kg/m²     Mental Status Examination:  Patients appearance was ill-appearing, hospital attire, and lying in bed. Thoughts are Illogical. Homicidal ideations none.  No abnormal movements, tics or mannerisms.  Memory impaired recent memory and remote memory Aims 0. Concentration Poor.   Alert and oriented X 4. Insight and Judgement impaired insight. Patient was cooperative. Patient gait Unsteady, 1:1. Mood decreased range, affect flat affect Hallucinations Absent, suicidal ideations no specific plan to harm self Speech normal pitch and normal volume    Data  Labs:   No visits with results within 2 Day(s) from this visit.   Latest known visit with results is:   Admission on 03/22/2025, Discharged on 03/24/2025   Component Date Value Ref Range Status    WBC 03/23/2025 7.9  4.0 - 11.0 K/uL Final    RBC 03/23/2025 4.42  4.20 - 5.90 M/uL Final    Hemoglobin 03/23/2025 13.1 (L)  13.5 - 17.5 g/dL 
Department of Psychiatry  AttendingProgress Note  Chief Complaint: Dementia with Behavioral Disturbance    Patient's chart was reviewed and collaborated with  about the treatment plan.    SUBJECTIVE:    Pt resting in bed, sitter at bedside for fall safety.  Pt with poor sleep overnight, up pacing, exit seeking, difficult to redirect per nursing notes.  He is able to wake easily today, calm and cooperative.  Alert, oriented to self only.   He is not sure where he is or why.   Pt compliant with medications.      Plan  3/24:  Resume medications - Zoloft 150 mg daily, Seroquel 50 mg BID (started 3/23/25), Aricept 10 mg daily.  3/25:  Increase HS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

## 2025-04-15 VITALS
RESPIRATION RATE: 20 BRPM | TEMPERATURE: 97.7 F | HEIGHT: 72 IN | DIASTOLIC BLOOD PRESSURE: 67 MMHG | BODY MASS INDEX: 26.34 KG/M2 | SYSTOLIC BLOOD PRESSURE: 143 MMHG | OXYGEN SATURATION: 99 % | HEART RATE: 67 BPM | WEIGHT: 194.45 LBS

## 2025-04-15 LAB
CHOLEST SERPL-MCNC: 114 MG/DL (ref 0–199)
EST. AVERAGE GLUCOSE BLD GHB EST-MCNC: 114 MG/DL
HBA1C MFR BLD: 5.6 %
HDLC SERPL-MCNC: 23 MG/DL (ref 40–60)
LDLC SERPL CALC-MCNC: ABNORMAL MG/DL
TRIGL SERPL-MCNC: 314 MG/DL (ref 0–150)
VLDLC SERPL CALC-MCNC: ABNORMAL MG/DL

## 2025-04-15 PROCEDURE — 36415 COLL VENOUS BLD VENIPUNCTURE: CPT

## 2025-04-15 PROCEDURE — 83036 HEMOGLOBIN GLYCOSYLATED A1C: CPT

## 2025-04-15 PROCEDURE — 80061 LIPID PANEL: CPT

## 2025-04-15 PROCEDURE — 99239 HOSP IP/OBS DSCHRG MGMT >30: CPT | Performed by: PSYCHIATRY & NEUROLOGY

## 2025-04-15 PROCEDURE — 5130000000 HC BRIDGE APPOINTMENT

## 2025-04-15 PROCEDURE — 6370000000 HC RX 637 (ALT 250 FOR IP)

## 2025-04-15 RX ORDER — MECOBALAMIN 5000 MCG
10 TABLET,DISINTEGRATING ORAL
Qty: 30 TABLET | Refills: 0 | Status: SHIPPED | OUTPATIENT
Start: 2025-04-15

## 2025-04-15 RX ORDER — QUETIAPINE FUMARATE 50 MG/1
50 TABLET, FILM COATED ORAL EVERY MORNING
Qty: 30 TABLET | Refills: 0 | Status: SHIPPED | OUTPATIENT
Start: 2025-04-16

## 2025-04-15 RX ORDER — QUETIAPINE FUMARATE 100 MG/1
100 TABLET, FILM COATED ORAL NIGHTLY
Qty: 30 TABLET | Refills: 0 | Status: SHIPPED | OUTPATIENT
Start: 2025-04-15

## 2025-04-15 RX ADMIN — ATORVASTATIN CALCIUM 20 MG: 10 TABLET, FILM COATED ORAL at 08:16

## 2025-04-15 RX ADMIN — QUETIAPINE FUMARATE 50 MG: 25 TABLET ORAL at 08:15

## 2025-04-15 RX ADMIN — DONEPEZIL HYDROCHLORIDE 10 MG: 5 TABLET, FILM COATED ORAL at 08:16

## 2025-04-15 RX ADMIN — SERTRALINE 150 MG: 100 TABLET, FILM COATED ORAL at 08:15

## 2025-04-15 NOTE — BH NOTE
[facility]  Discharge Note    Pt discharged with followings belongings:   Dental Appliances: None  Vision - Corrective Lenses: Eyeglasses  Hearing Aid: None  Jewelry: None  Body Piercings Removed: No  Clothing: Pants, Slippers, Socks, Undergarments, Shirt (3 shirts, 2 pair pants, 2 socks, slippers 3 underwear)  Other Valuables: Money   Valuables sent home with patient or returned to patient. Patient educated on aftercare instructions: patient unable AVS sent with patient  Patient verbalize understanding of AVS:  patient unable.    Status EXAM upon discharge:  Mental Status and Behavioral Exam  Normal: No  Level of Assistance: Independent/Self  Facial Expression: Flat, Worried  Affect: Blunt  Level of Consciousness: Confused  Frequency of Checks: 4 times per hour, close  Mood:Normal: No  Mood: Labile  Motor Activity:Normal: No  Motor Activity: Decreased  Eye Contact: Fair  Observed Behavior: Impulsive  Sexual Misconduct History: Current - no  Preception: [place] to person  Attention:Normal: No  Attention: Distractible, Unable to concentrate  Thought Processes: Blocking  Thought Content:Normal: No  Thought Content: Poverty of content  Depression Symptoms: Change in energy level  Anxiety Symptoms: Generalized  Shannan Symptoms: No problems reported or observed.  Hallucinations: None  Delusions: No  Delusions: Other (comment)  Memory:Normal: No  Memory: Poor recent, Poor remote  Insight and Judgment: No  Insight and Judgment: Poor judgment, Poor insight    Tobacco Screening:  Practical Counseling, on admission, judy X, if applicable and completed (first 3 are required if patient doesn't refuse)non smoker:            ( ) Recognizing danger situations (included triggers and roadblocks)                    ( ) Coping skills (new ways to manage stress,relaxation techniques, changing routine, distraction)                                                           ( ) Basic information about quitting (benefits of

## 2025-04-15 NOTE — BH NOTE
Patient intrusive going into other patient's room. Patient is easily redirectable. Patient medicated with Trazodone 50 mg po for sleep. Patient resting quietly in bed @ present.

## 2025-04-15 NOTE — GROUP NOTE
Group Therapy Note    Date: 4/15/2025    Group Start Time: 1330  Group End Time: 1415  Group Topic: Activity    [facility]    Radha Figueroa LISW        Group Therapy Note    Patient was able to participate in self care activity.    Attendees: 6    Notes:  Patient was able to participate in self care activity. Patient left group early.    Status After Intervention:  Improved    Participation Level: Active Listener    Participation Quality: Appropriate      Speech:  normal      Thought Process/Content: Logical      Affective Functioning: Congruent      Mood: euthymic      Level of consciousness:  Alert      Response to Learning: Able to verbalize current knowledge/experience      Endings: None Reported    Modes of Intervention: Education, Support, Exploration, and Problem-solving      Discipline Responsible: /Counselor      Signature:  FADI Arenas

## 2025-04-15 NOTE — BH NOTE
Patient alert and oriented x 1 to self only disoriented to place and date. Patient seclusive to his bed and room this evening. Patient denies SI/HI/A/V/H. Patient took HS medication. No angry outbursts noted. No c/o's voiced @ present.

## 2025-04-15 NOTE — BH NOTE
[facility]  Treatment Team Note  Review Date & Time: 4/14/2025   0900    Patient was not in treatment team      Status EXAM:   Mental Status and Behavioral Exam  Normal: No  Level of Assistance: Independent/Self  Facial Expression: Flat  Affect: Blunt  Level of Consciousness: Confused  Frequency of Checks: 4 times per hour, close  Mood:Normal: No  Mood: Elated  Motor Activity:Normal: No  Motor Activity: Increased  Eye Contact: Fair  Observed Behavior: Withdrawn  Sexual Misconduct History: Current - no  Preception: [place] to person  Attention:Normal: No  Attention: Distractible  Thought Processes: Blocking  Thought Content:Normal: No  Thought Content: Poverty of content  Depression Symptoms: Change in energy level  Anxiety Symptoms: Generalized  Shannan Symptoms: No problems reported or observed.  Hallucinations: None  Delusions: No  Delusions: Other (comment)  Memory:Normal: No  Memory: Poor recent  Insight and Judgment: No  Insight and Judgment: Poor judgment, Poor insight      Suicide Risk CSSR-S:  1) Within the past month, have you wished you were dead or wished you could go to sleep and not wake up? : No  2) Have you actually had any thoughts of killing yourself? : No  6) Have you ever done anything, started to do anything, or prepared to do anything to end your life?: No      PLAN/TREATMENT RECOMMENDATIONS UPDATE:   Patient will take medication as prescribed, eat 75% of meals, attend groups, participate in milieu activities, participate in treatment team and care planning for discharge and follow up.            Sekou Ruby RN

## 2025-04-15 NOTE — BH NOTE
Called [facility] x3. After 3rd attempt, smoke with [facility] and report given. [facility] made aware that patient had 2 pocket knives that were given to the .

## 2025-04-15 NOTE — GROUP NOTE
Group Therapy Note    Date: 4/15/2025    Group Start Time: 1000  Group End Time: 1115  Group Topic: Activity    [facility]    Jeanette López MSW        Group Therapy Note  Clinician engaged the patients in a Easter craft activity. Clinician distributed a coloring sheet for the patients to color. Clinician cut out the picture for the patients and glued it together to make a wearable paper  headband. Clinician played music while the patients colored and engaged them in reminiscing conversations about favorite Easter traditions and memories.   Attendees: 5         Notes:  Patient came to the beginning of the group but left shortly after it started. Patient did not participate in the activity or engage with others.     Status After Intervention:  Unchanged    Participation Level: Minimal     Participation Quality: Resistant      Speech:  normal      Thought Process/Content: Logical      Affective Functioning: Flat      Mood: irritable      Level of consciousness:  Alert and Oriented x4      Response to Learning: Resistant      Endings: None Reported    Modes of Intervention: Socialization and Activity      Discipline Responsible: /Counselor      Signature:  FAUSTINA Guido

## 2025-04-15 NOTE — CARE COORDINATION
Spoke with Andreas with [facility] who stated they spoke with wife and should be able to accept pt for admission today. Transport ETA is 15:00          Spoke with pt's wife and updated her on pt's progress and details of today's discharge.

## 2025-04-15 NOTE — PLAN OF CARE
Problem: ABCDS Injury Assessment  Goal: Absence of physical injury  Outcome: Adequate for Discharge     Problem: Confusion  Goal: Confusion, delirium, dementia, or psychosis is improved or at baseline  Outcome: Adequate for Discharge     Problem: Safety - Adult  Goal: Free from fall injury  Outcome: Adequate for Discharge     Problem: Risk for Elopement  Goal: Patient will not exit the unit/facility without proper excort  Outcome: Adequate for Discharge     Problem: Anxiety  Goal: Will report anxiety at manageable levels  Outcome: Adequate for Discharge     Problem: Coping  Goal: Pt/Family able to verbalize concerns and demonstrate effective coping strategies  Outcome: Adequate for Discharge     Problem: Behavior  Goal: Pt/Family maintain appropriate behavior and adhere to behavioral management agreement, if implemented  Outcome: Adequate for Discharge     Problem: Depression/Self Harm  Goal: Effect of psychiatric condition will be minimized and patient will be protected from self harm  Outcome: Adequate for Discharge     Problem: Spiritual Care  Goal: Pt/Family able to move forward in process of forgiving self, others, and/or higher power  Outcome: Adequate for Discharge     Problem: Pain  Goal: Verbalizes/displays adequate comfort level or baseline comfort level  Outcome: Adequate for Discharge     Problem: Depression  Goal: Will be euthymic at discharge  Outcome: Adequate for Discharge     Problem: Shannan  Goal: Will exhibit normal sleep and speech and no impulsivity  Outcome: Adequate for Discharge     Problem: Psychosis  Goal: Will report no hallucinations or delusions  Outcome: Adequate for Discharge     Problem: Sleep Disturbance  Goal: Will exhibit normal sleeping pattern  Outcome: Adequate for Discharge     Problem: Self Care Deficit  Goal: Return ADL status to a safe level of function  Outcome: Adequate for Discharge     Problem: Skin/Tissue Integrity  Goal: Skin integrity remains intact  Outcome: 
  Problem: ABCDS Injury Assessment  Goal: Absence of physical injury  Outcome: Progressing     Problem: Safety - Adult  Goal: Free from fall injury  Outcome: Progressing     Problem: Anxiety  Goal: Will report anxiety at manageable levels  Description: INTERVENTIONS:  1. Administer medication as ordered  2. Teach and rehearse alternative coping skills  3. Provide emotional support with 1:1 interaction with staff  Outcome: Progressing  Flowsheets (Taken 3/24/2025 1258 by Ree Ortiz RN)  Will report anxiety at manageable levels:   Administer medication as ordered   Teach and rehearse alternative coping skills   Provide emotional support with 1:1 interaction with staff     Problem: Coping  Goal: Pt/Family able to verbalize concerns and demonstrate effective coping strategies  Description: INTERVENTIONS:  1. Assist patient/family to identify coping skills, available support systems and cultural and spiritual values  2. Provide emotional support, including active listening and acknowledgement of concerns of patient and caregivers  3. Reduce environmental stimuli, as able  4. Instruct patient/family in relaxation techniques, as appropriate  5. Assess for spiritual pain/suffering and initiate Spiritual Care, Psychosocial Clinical Specialist consults as needed  Outcome: Progressing     Problem: Depression/Self Harm  Goal: Effect of psychiatric condition will be minimized and patient will be protected from self harm  Description: INTERVENTIONS:  1. Assess impact of patient's symptoms on level of functioning, self care needs and offer support as indicated  2. Assess patient/family knowledge of depression, impact on illness and need for teaching  3. Provide emotional support, presence and reassurance  4. Assess for possible suicidal thoughts or ideation. If patient expresses suicidal thoughts or statements do not leave alone, initiate Suicide Precautions, move to a room close to the nursing station and 
  Problem: ABCDS Injury Assessment  Goal: Absence of physical injury  Recent Flowsheet Documentation  Taken 3/24/2025 0943 by Ree Ortiz, RN  Absence of Physical Injury: Implement safety measures based on patient assessment     Problem: Safety - Adult  Goal: Free from fall injury  Recent Flowsheet Documentation  Taken 3/24/2025 1001 by Ree Ortiz, RN  Free From Fall Injury:   Instruct family/caregiver on patient safety   Based on caregiver fall risk screen, instruct family/caregiver to ask for assistance with transferring infant if caregiver noted to have fall risk factors     Problem: Behavior  Goal: Pt/Family maintain appropriate behavior and adhere to behavioral management agreement, if implemented  Description: INTERVENTIONS:  1. Assess patient/family's coping skills and  non-compliant behavior (including use of illegal substances)  2. Notify security of behavior or suspected illegal substances which indicate the need for search of the family and/or belongings  3. Encourage verbalization of thoughts and concerns in a socially appropriate manner  4. Utilize positive, consistent limit setting strategies supporting safety of patient, staff and others  5. Encourage participation in the decision making process about the behavioral management agreement  6. If a visitor's behavior poses a threat to safety call refer to organization policy.  7. Initiate consult with , Psychosocial CNS, Spiritual Care as appropriate  Outcome: Progressing  Flowsheets (Taken 3/24/2025 1125)  Patient/family maintains appropriate behavior and adheres to behavioral management agreement, if implemented:   Assess patient/family’s coping skills and  non-compliant behavior (including use of illegal substances)   Utilize positive, consistent limit setting strategies supporting safety of patient, staff and others   If a patient’s behavior jeopardizes the safety of the patient, staff, or others 
  Problem: Anxiety  Goal: Will report anxiety at manageable levels  Description: INTERVENTIONS:  1. Administer medication as ordered  2. Teach and rehearse alternative coping skills  3. Provide emotional support with 1:1 interaction with staff  Outcome: Progressing     Problem: Coping  Goal: Pt/Family able to verbalize concerns and demonstrate effective coping strategies  Description: INTERVENTIONS:  1. Assist patient/family to identify coping skills, available support systems and cultural and spiritual values  2. Provide emotional support, including active listening and acknowledgement of concerns of patient and caregivers  3. Reduce environmental stimuli, as able  4. Instruct patient/family in relaxation techniques, as appropriate  5. Assess for spiritual pain/suffering and initiate Spiritual Care, Psychosocial Clinical Specialist consults as needed  Outcome: Progressing     Problem: Behavior  Goal: Pt/Family maintain appropriate behavior and adhere to behavioral management agreement, if implemented  Description: INTERVENTIONS:  1. Assess patient/family's coping skills and  non-compliant behavior (including use of illegal substances)  2. Notify security of behavior or suspected illegal substances which indicate the need for search of the family and/or belongings  3. Encourage verbalization of thoughts and concerns in a socially appropriate manner  4. Utilize positive, consistent limit setting strategies supporting safety of patient, staff and others  5. Encourage participation in the decision making process about the behavioral management agreement  6. If a visitor's behavior poses a threat to safety call refer to organization policy.  7. Initiate consult with , Psychosocial CNS, Spiritual Care as appropriate  4/10/2025 2026 by Micheline Correa RN  Outcome: Progressing     Problem: Confusion  Goal: Confusion, delirium, dementia, or psychosis is improved or at baseline  Description: 
  Problem: Behavior  Goal: Pt/Family maintain appropriate behavior and adhere to behavioral management agreement, if implemented  Description: INTERVENTIONS:  1. Assess patient/family's coping skills and  non-compliant behavior (including use of illegal substances)  2. Notify security of behavior or suspected illegal substances which indicate the need for search of the family and/or belongings  3. Encourage verbalization of thoughts and concerns in a socially appropriate manner  4. Utilize positive, consistent limit setting strategies supporting safety of patient, staff and others  5. Encourage participation in the decision making process about the behavioral management agreement  6. If a visitor's behavior poses a threat to safety call refer to organization policy.  7. Initiate consult with , Psychosocial CNS, Spiritual Care as appropriate  Outcome: Progressing     Spencer was visible in the milieu this shift after he got up this morning.  Spencer slept until about 1000 this morning and he got up in a good mood.  Spencer was compliant with all assessments and medications.  He ate all of his meals in the day room. Spencer has become more restless this evening as evidenced by carrying a small bundle of books and papers while walking the halls.  Spencer has also asked several times when he will be leaving.  Spencer is told that he will be staying tonight and that he has a room to sleep in.  This nurse has offered several times to show Spencer his room but Spencer declines.   
  Problem: Behavior  Goal: Pt/Family maintain appropriate behavior and adhere to behavioral management agreement, if implemented  Description: INTERVENTIONS:  1. Assess patient/family's coping skills and  non-compliant behavior (including use of illegal substances)  2. Notify security of behavior or suspected illegal substances which indicate the need for search of the family and/or belongings  3. Encourage verbalization of thoughts and concerns in a socially appropriate manner  4. Utilize positive, consistent limit setting strategies supporting safety of patient, staff and others  5. Encourage participation in the decision making process about the behavioral management agreement  6. If a visitor's behavior poses a threat to safety call refer to organization policy.  7. Initiate consult with , Psychosocial CNS, Spiritual Care as appropriate  Outcome: Progressing   Spencer has been visible in the milieu this shift.  He was compliant with all medications and assessments.  Spencer has asked several times this evening about going home.  Spencer accepts verbal redirection easily.    Spencer ate all meals in the day room and ate 100% of each meal.    
  Problem: Behavior  Goal: Pt/Family maintain appropriate behavior and adhere to behavioral management agreement, if implemented  Description: INTERVENTIONS:  1. Assess patient/family's coping skills and  non-compliant behavior (including use of illegal substances)  2. Notify security of behavior or suspected illegal substances which indicate the need for search of the family and/or belongings  3. Encourage verbalization of thoughts and concerns in a socially appropriate manner  4. Utilize positive, consistent limit setting strategies supporting safety of patient, staff and others  5. Encourage participation in the decision making process about the behavioral management agreement  6. If a visitor's behavior poses a threat to safety call refer to organization policy.  7. Initiate consult with , Psychosocial CNS, Spiritual Care as appropriate  Outcome: Progressing   Spencer was visible in the milieu for most of the day.  He was compliant with assessments and medications.  Spencer likes to walk and pace the hallways, which he did much of this shift.  Spencer had 1 incontinence episode this shift.  Spencer was compliant with going into his room, sitting on the bed and changing his pants with verbal cues.  Dirty clothing is I the washer  
  Problem: Behavior  Goal: Pt/Family maintain appropriate behavior and adhere to behavioral management agreement, if implemented  Description: INTERVENTIONS:  1. Assess patient/family's coping skills and  non-compliant behavior (including use of illegal substances)  2. Notify security of behavior or suspected illegal substances which indicate the need for search of the family and/or belongings  3. Encourage verbalization of thoughts and concerns in a socially appropriate manner  4. Utilize positive, consistent limit setting strategies supporting safety of patient, staff and others  5. Encourage participation in the decision making process about the behavioral management agreement  6. If a visitor's behavior poses a threat to safety call refer to organization policy.  7. Initiate consult with , Psychosocial CNS, Spiritual Care as appropriate  Outcome: Rashid Palmer was resting in bed for much of the morning.  He got up around lunch time and ate lunch, was compliant with all assessments and was compliant with his scheduled medications.      Spencer has been oriented to self only.  He requires verbal redirection when he is looking for his room because he cannot always find the correct room.      Spencer was incontinent of urine once today.  He was able to clean himself and change his clothing with verbal instruction.  
  Problem: Confusion  Goal: Confusion, delirium, dementia, or psychosis is improved or at baseline  4/9/2025 0140 by Pedro Luis Salmeron, RN  Outcome: Not Progressing  Flowsheets (Taken 4/9/2025 0140)  Effect of thought disturbance (confusion, delirium, dementia, or psychosis) are managed with adequate functional status:   [place] high risk fall precautions, as indicated   Provide frequent short contacts to provide reality reorientation, refocusing and direction   Decrease environmental stimuli, including noise as appropriate  4/8/2025 1843 by Consuelo Valle RN  Outcome: Not Progressing     Problem: Anxiety  Goal: Will report anxiety at manageable levels  Outcome: Progressing  Flowsheets  Taken 4/9/2025 0140  Will report anxiety at manageable levels:   Administer medication as ordered   Provide emotional support with 1:1 interaction with staff   Teach and rehearse alternative coping skills  Taken 4/8/2025 2253  Will report anxiety at manageable levels:   Administer medication as ordered   Teach and rehearse alternative coping skills   Provide emotional support with 1:1 interaction with staff     Problem: Behavior  Goal: Pt/Family maintain appropriate behavior and adhere to behavioral management agreement, if implemented  Outcome: Progressing  Flowsheets (Taken 4/9/2025 0140)  Patient/family maintains appropriate behavior and adheres to behavioral management agreement, if implemented:   Assess patient/family’s coping skills and  non-compliant behavior (including use of illegal substances)   Encourage verbalization of thoughts and concerns in a socially appropriate manner   Utilize positive, consistent limit setting strategies supporting safety of patient, staff and others     Problem: Confusion  Goal: Confusion, delirium, dementia, or psychosis is improved or at baseline  4/9/2025 0140 by Pedro Luis Salmeron, RN  Outcome: Not Progressing  Flowsheets (Taken 4/9/2025 0140)  Effect of thought disturbance (confusion, delirium, 
  Problem: Confusion  Goal: Confusion, delirium, dementia, or psychosis is improved or at baseline  Description: INTERVENTIONS:  1. Assess for possible contributors to thought disturbance, including medications, impaired vision or hearing, underlying metabolic abnormalities, dehydration, psychiatric diagnoses, and notify attending LIP  2. [place] high risk fall precautions, as indicated  3. Provide frequent short contacts to provide reality reorientation, refocusing and direction  4. Decrease environmental stimuli, including noise as appropriate  5. Monitor and intervene to maintain adequate nutrition, hydration, elimination, sleep and activity  6. If unable to ensure safety without constant attention obtain sitter and review sitter guidelines with assigned personnel  7. Initiate Psychosocial CNS and Spiritual Care consult, as indicated  Outcome: Not Progressing     Problem: Sleep Disturbance  Goal: Will exhibit normal sleeping pattern  Description: INTERVENTIONS:  1. Administer medication as ordered  2. Decrease environmental stimuli, including noise, as appropriate  3. Discourage social isolation and naps during the day  Outcome: Not Progressing     Problem: Self Care Deficit  Goal: Return ADL status to a safe level of function  Description: INTERVENTIONS:  1. Administer medication as ordered  2. Assess ADL deficits and provide assistive devices as needed  3. Obtain PT/OT consults as needed  4. Assist and instruct patient to increase activity and self care as tolerated  Outcome: Not Progressing      Problem: Safety - Medical Restraint  Goal: Remains free of injury from restraints (Restraint for Interference with Medical Device)  Description: INTERVENTIONS:  1. Determine that other, less restrictive measures have been tried or would not be effective before applying the restraint  2. Evaluate the patient's condition at the time of restraint application  3. Inform patient/family regarding the reason for 
  Problem: Confusion  Goal: Confusion, delirium, dementia, or psychosis is improved or at baseline  Description: INTERVENTIONS:  1. Assess for possible contributors to thought disturbance, including medications, impaired vision or hearing, underlying metabolic abnormalities, dehydration, psychiatric diagnoses, and notify attending LIP  2. [place] high risk fall precautions, as indicated  3. Provide frequent short contacts to provide reality reorientation, refocusing and direction  4. Decrease environmental stimuli, including noise as appropriate  5. Monitor and intervene to maintain adequate nutrition, hydration, elimination, sleep and activity  6. If unable to ensure safety without constant attention obtain sitter and review sitter guidelines with assigned personnel  7. Initiate Psychosocial CNS and Spiritual Care consult, as indicated  Outcome: Not Progressing     Problem: Risk for Elopement  Goal: Patient will not exit the unit/facility without proper excort  Outcome: Not Progressing     Problem: Confusion  Goal: Confusion, delirium, dementia, or psychosis is improved or at baseline  Description: INTERVENTIONS:  1. Assess for possible contributors to thought disturbance, including medications, impaired vision or hearing, underlying metabolic abnormalities, dehydration, psychiatric diagnoses, and notify attending LIP  2. [place] high risk fall precautions, as indicated  3. Provide frequent short contacts to provide reality reorientation, refocusing and direction  4. Decrease environmental stimuli, including noise as appropriate  5. Monitor and intervene to maintain adequate nutrition, hydration, elimination, sleep and activity  6. If unable to ensure safety without constant attention obtain sitter and review sitter guidelines with assigned personnel  7. Initiate Psychosocial CNS and Spiritual Care consult, as indicated  Outcome: Not Progressing     Problem: Risk for Elopement  Goal: Patient will not exit the 
  Problem: Confusion  Goal: Confusion, delirium, dementia, or psychosis is improved or at baseline  Description: INTERVENTIONS:  1. Assess for possible contributors to thought disturbance, including medications, impaired vision or hearing, underlying metabolic abnormalities, dehydration, psychiatric diagnoses, and notify attending LIP  2. [place] high risk fall precautions, as indicated  3. Provide frequent short contacts to provide reality reorientation, refocusing and direction  4. Decrease environmental stimuli, including noise as appropriate  5. Monitor and intervene to maintain adequate nutrition, hydration, elimination, sleep and activity  6. If unable to ensure safety without constant attention obtain sitter and review sitter guidelines with assigned personnel  7. Initiate Psychosocial CNS and Spiritual Care consult, as indicated  Outcome: Not Progressing  Flowsheets (Taken 4/6/2025 2245 by Jannette Drake LPN)  Effect of thought disturbance (confusion, delirium, dementia, or psychosis) are managed with adequate functional status:   Assess for contributors to thought disturbance, including medications, impaired vision or hearing, underlying metabolic abnormalities, dehydration, psychiatric diagnoses, notify LIP   [place] high risk fall precautions, as indicated   Provide frequent short contacts to provide reality reorientation, refocusing and direction   Decrease environmental stimuli, including noise as appropriate   Monitor and intervene to maintain adequate nutrition, hydration, elimination, sleep and activity   If unable to ensure safety without constant attention obtain sitter and review sitter guidelines with assigned personnel     Problem: Risk for Elopement  Goal: Patient will not exit the unit/facility without proper excort  Outcome: Not Progressing  Flowsheets (Taken 4/6/2025 2245 by Jannette Drake LPN)  Nursing Interventions for Elopement Risk: Assist with personal care needs such as toileting, 
  Problem: Confusion  Goal: Confusion, delirium, dementia, or psychosis is improved or at baseline  Description: INTERVENTIONS:  1. Assess for possible contributors to thought disturbance, including medications, impaired vision or hearing, underlying metabolic abnormalities, dehydration, psychiatric diagnoses, and notify attending LIP  2. [place] high risk fall precautions, as indicated  3. Provide frequent short contacts to provide reality reorientation, refocusing and direction  4. Decrease environmental stimuli, including noise as appropriate  5. Monitor and intervene to maintain adequate nutrition, hydration, elimination, sleep and activity  6. If unable to ensure safety without constant attention obtain sitter and review sitter guidelines with assigned personnel  7. Initiate Psychosocial CNS and Spiritual Care consult, as indicated  Outcome: Progressing     Problem: Safety - Adult  Goal: Free from fall injury  Outcome: Progressing     Problem: Risk for Elopement  Goal: Patient will not exit the unit/facility without proper excort  Outcome: Progressing     Problem: Anxiety  Goal: Will report anxiety at manageable levels  Description: INTERVENTIONS:  1. Administer medication as ordered  2. Teach and rehearse alternative coping skills  3. Provide emotional support with 1:1 interaction with staff  Outcome: Progressing     Problem: Coping  Goal: Pt/Family able to verbalize concerns and demonstrate effective coping strategies  Description: INTERVENTIONS:  1. Assist patient/family to identify coping skills, available support systems and cultural and spiritual values  2. Provide emotional support, including active listening and acknowledgement of concerns of patient and caregivers  3. Reduce environmental stimuli, as able  4. Instruct patient/family in relaxation techniques, as appropriate  5. Assess for spiritual pain/suffering and initiate Spiritual Care, Psychosocial Clinical Specialist consults as 
  Problem: Confusion  Goal: Confusion, delirium, dementia, or psychosis is improved or at baseline  Description: INTERVENTIONS:  1. Assess for possible contributors to thought disturbance, including medications, impaired vision or hearing, underlying metabolic abnormalities, dehydration, psychiatric diagnoses, and notify attending LIP  2. [place] high risk fall precautions, as indicated  3. Provide frequent short contacts to provide reality reorientation, refocusing and direction  4. Decrease environmental stimuli, including noise as appropriate  5. Monitor and intervene to maintain adequate nutrition, hydration, elimination, sleep and activity  6. If unable to ensure safety without constant attention obtain sitter and review sitter guidelines with assigned personnel  7. Initiate Psychosocial CNS and Spiritual Care consult, as indicated  Outcome: Not Progressing     Problem: Risk for Elopement  Goal: Patient will not exit the unit/facility without proper excort  Outcome: Not Progressing     Problem: Behavior  Goal: Pt/Family maintain appropriate behavior and adhere to behavioral management agreement, if implemented  Description: INTERVENTIONS:  1. Assess patient/family's coping skills and  non-compliant behavior (including use of illegal substances)  2. Notify security of behavior or suspected illegal substances which indicate the need for search of the family and/or belongings  3. Encourage verbalization of thoughts and concerns in a socially appropriate manner  4. Utilize positive, consistent limit setting strategies supporting safety of patient, staff and others  5. Encourage participation in the decision making process about the behavioral management agreement  6. If a visitor's behavior poses a threat to safety call refer to organization policy.  7. Initiate consult with , Psychosocial CNS, Spiritual Care as appropriate  Outcome: Not Progressing   Pt has had no c/o pain thus far this shift. He 
  Problem: Confusion  Goal: Confusion, delirium, dementia, or psychosis is improved or at baseline  Description: INTERVENTIONS:  1. Assess for possible contributors to thought disturbance, including medications, impaired vision or hearing, underlying metabolic abnormalities, dehydration, psychiatric diagnoses, and notify attending LIP  2. [place] high risk fall precautions, as indicated  3. Provide frequent short contacts to provide reality reorientation, refocusing and direction  4. Decrease environmental stimuli, including noise as appropriate  5. Monitor and intervene to maintain adequate nutrition, hydration, elimination, sleep and activity  6. If unable to ensure safety without constant attention obtain sitter and review sitter guidelines with assigned personnel  7. Initiate Psychosocial CNS and Spiritual Care consult, as indicated  Outcome: Rashid Palmer was visible in the milieu during much of the shift.  He was compliant with all medications and assessments this shift.  Spencer takes his medications whole with water.    Spencer is alert to self only.  He requires frequent verbal redirection to find his room and to not enter peers rooms.    Spencer ate meals in the day room and ate % of each meal.    
  Problem: Confusion  Goal: Confusion, delirium, dementia, or psychosis is improved or at baseline  Description: INTERVENTIONS:  1. Assess for possible contributors to thought disturbance, including medications, impaired vision or hearing, underlying metabolic abnormalities, dehydration, psychiatric diagnoses, and notify attending LIP  2. [place] high risk fall precautions, as indicated  3. Provide frequent short contacts to provide reality reorientation, refocusing and direction  4. Decrease environmental stimuli, including noise as appropriate  5. Monitor and intervene to maintain adequate nutrition, hydration, elimination, sleep and activity  6. If unable to ensure safety without constant attention obtain sitter and review sitter guidelines with assigned personnel  7. Initiate Psychosocial CNS and Spiritual Care consult, as indicated  3/29/2025 1151 by Ramonita Avery RN  Outcome: Progressing    Problem: Risk for Elopement  Goal: Patient will not exit the unit/facility without proper excort  Outcome: Progressing     Problem: Anxiety  Goal: Will report anxiety at manageable levels  Description: INTERVENTIONS:  1. Administer medication as ordered  2. Teach and rehearse alternative coping skills  3. Provide emotional support with 1:1 interaction with staff  3/29/2025 1155 by Ramonita Avery, RN  Outcome: Progressing    
  Problem: Confusion  Goal: Confusion, delirium, dementia, or psychosis is improved or at baseline  Description: INTERVENTIONS:  1. Assess for possible contributors to thought disturbance, including medications, impaired vision or hearing, underlying metabolic abnormalities, dehydration, psychiatric diagnoses, and notify attending LIP  2. [place] high risk fall precautions, as indicated  3. Provide frequent short contacts to provide reality reorientation, refocusing and direction  4. Decrease environmental stimuli, including noise as appropriate  5. Monitor and intervene to maintain adequate nutrition, hydration, elimination, sleep and activity  6. If unable to ensure safety without constant attention obtain sitter and review sitter guidelines with assigned personnel  7. Initiate Psychosocial CNS and Spiritual Care consult, as indicated  Outcome: Not Progressing     Problem: Safety - Adult  Goal: Free from fall injury  Outcome: Progressing     Problem: Anxiety  Goal: Will report anxiety at manageable levels  Description: INTERVENTIONS:  1. Administer medication as ordered  2. Teach and rehearse alternative coping skills  3. Provide emotional support with 1:1 interaction with staff  Outcome: Not Progressing     Problem: Confusion  Goal: Confusion, delirium, dementia, or psychosis is improved or at baseline  Description: INTERVENTIONS:  1. Assess for possible contributors to thought disturbance, including medications, impaired vision or hearing, underlying metabolic abnormalities, dehydration, psychiatric diagnoses, and notify attending LIP  2. [place] high risk fall precautions, as indicated  3. Provide frequent short contacts to provide reality reorientation, refocusing and direction  4. Decrease environmental stimuli, including noise as appropriate  5. Monitor and intervene to maintain adequate nutrition, hydration, elimination, sleep and activity  6. If unable to ensure safety without constant attention obtain 
  Problem: Confusion  Goal: Confusion, delirium, dementia, or psychosis is improved or at baseline  Description: INTERVENTIONS:  1. Assess for possible contributors to thought disturbance, including medications, impaired vision or hearing, underlying metabolic abnormalities, dehydration, psychiatric diagnoses, and notify attending LIP  2. [place] high risk fall precautions, as indicated  3. Provide frequent short contacts to provide reality reorientation, refocusing and direction  4. Decrease environmental stimuli, including noise as appropriate  5. Monitor and intervene to maintain adequate nutrition, hydration, elimination, sleep and activity  6. If unable to ensure safety without constant attention obtain sitter and review sitter guidelines with assigned personnel  7. Initiate Psychosocial CNS and Spiritual Care consult, as indicated  3/30/2025 1108 by Ely Casarez RN  Outcome: Not Progressing  3/29/2025 2128 by Madeline Sin LPN  Outcome: Not Progressing     Problem: Risk for Elopement  Goal: Patient will not exit the unit/facility without proper excort  3/29/2025 2128 by Madeline Sin LPN  Outcome: Not Progressing     Problem: Sleep Disturbance  Goal: Will exhibit normal sleeping pattern  Description: INTERVENTIONS:  1. Administer medication as ordered  2. Decrease environmental stimuli, including noise, as appropriate  3. Discourage social isolation and naps during the day  3/29/2025 2128 by Madeline Sin LPN  Outcome: Not Progressing     
  Problem: Confusion  Goal: Confusion, delirium, dementia, or psychosis is improved or at baseline  Description: INTERVENTIONS:  1. Assess for possible contributors to thought disturbance, including medications, impaired vision or hearing, underlying metabolic abnormalities, dehydration, psychiatric diagnoses, and notify attending LIP  2. [place] high risk fall precautions, as indicated  3. Provide frequent short contacts to provide reality reorientation, refocusing and direction  4. Decrease environmental stimuli, including noise as appropriate  5. Monitor and intervene to maintain adequate nutrition, hydration, elimination, sleep and activity  6. If unable to ensure safety without constant attention obtain sitter and review sitter guidelines with assigned personnel  7. Initiate Psychosocial CNS and Spiritual Care consult, as indicated  Outcome: Progressing     Problem: Safety - Adult  Goal: Free from fall injury  Outcome: Progressing     Problem: Risk for Elopement  Goal: Patient will not exit the unit/facility without proper excort  Outcome: Progressing     Problem: Depression/Self Harm  Goal: Effect of psychiatric condition will be minimized and patient will be protected from self harm  Description: INTERVENTIONS:  1. Assess impact of patient's symptoms on level of functioning, self care needs and offer support as indicated  2. Assess patient/family knowledge of depression, impact on illness and need for teaching  3. Provide emotional support, presence and reassurance  4. Assess for possible suicidal thoughts or ideation. If patient expresses suicidal thoughts or statements do not leave alone, initiate Suicide Precautions, move to a room close to the nursing station and obtain sitter  5. Initiate consults as appropriate with Mental Health Professional, Spiritual Care, Psychosocial CNS, and consider a recommendation to the LIP for a Psychiatric Consultation  Outcome: Progressing   Pt has been in bed the entire 
  Problem: Confusion  Goal: Confusion, delirium, dementia, or psychosis is improved or at baseline  Outcome: Progressing     Problem: Risk for Elopement  Goal: Patient will not exit the unit/facility without proper excort  Outcome: Progressing     Problem: Anxiety  Goal: Will report anxiety at manageable levels  Outcome: Progressing     Problem: Behavior  Goal: Pt/Family maintain appropriate behavior and adhere to behavioral management agreement, if implemented  Outcome: Progressing     Problem: Depression/Self Harm  Goal: Effect of psychiatric condition will be minimized and patient will be protected from self harm  Outcome: Progressing    Patient relaxed, visible on the unit @ intervals. Medication compliant. Denies SI/HI/AVH. Refused shower. Ambulating in hallways, does well with redirection. No behavioral issues Will continue to monitor.     
  Problem: Confusion  Goal: Confusion, delirium, dementia, or psychosis is improved or at baseline  Outcome: Progressing     Problem: Risk for Elopement  Goal: Patient will not exit the unit/facility without proper excort  Outcome: Progressing     Problem: Anxiety  Goal: Will report anxiety at manageable levels  Outcome: Progressing     Problem: Behavior  Goal: Pt/Family maintain appropriate behavior and adhere to behavioral management agreement, if implemented  Outcome: Progressing    Patient relaxed, visible @ intervals throughout the day. Oriented x1. Medication compliant. Social with select peers. Denies SI/HI/AVH. Resting in bed. No behavioral issues Will continue to monitor.      
  Problem: Confusion  Goal: Confusion, delirium, dementia, or psychosis is improved or at baseline  Outcome: Progressing     Problem: Safety - Adult  Goal: Free from fall injury  Outcome: Progressing     Problem: Risk for Elopement  Goal: Patient will not exit the unit/facility without proper excort  Outcome: Progressing     Problem: Anxiety  Goal: Will report anxiety at manageable levels  Outcome: Progressing    Patient relaxed, visible @ intervals throughout the day. Oriented x1. Medication compliant. Denies SI/HI/AVH. Resting in bed. No behavioral issues Will continue to monitor.     
  Problem: Confusion  Goal: Confusion, delirium, dementia, or psychosis is improved or at baseline  Outcome: Progressing     Problem: Safety - Adult  Goal: Free from fall injury  Outcome: Progressing     Problem: Risk for Elopement  Goal: Patient will not exit the unit/facility without proper excort  Outcome: Progressing     Problem: Behavior  Goal: Pt/Family maintain appropriate behavior and adhere to behavioral management agreement, if implemented  Outcome: Progressing     Problem: Depression/Self Harm  Goal: Effect of psychiatric condition will be minimized and patient will be protected from self harm  Outcome: Progressing    Patient relaxed, visible @ intervals throughout the day. Oriented x1. Medication compliant. Given shower this am Denies SI/HI/AVH. Resting in bed. No behavioral issues Will continue to monitor.      
  Problem: Safety - Adult  Goal: Free from fall injury  4/2/2025 1115 by Ree Ortiz RN  Flowsheets (Taken 3/24/2025 1001)  Free From Fall Injury:   Instruct family/caregiver on patient safety   Based on caregiver fall risk screen, instruct family/caregiver to ask for assistance with transferring infant if caregiver noted to have fall risk factors  Note: Will have no falls or injuries by using safety precautions.  4/2/2025 0109 by Becca Crowell RN  Outcome: Progressing     Problem: Anxiety  Goal: Will report anxiety at manageable levels  Description: INTERVENTIONS:  1. Administer medication as ordered  2. Teach and rehearse alternative coping skills  3. Provide emotional support with 1:1 interaction with staff  4/2/2025 1115 by Ree Ortiz RN  Flowsheets (Taken 4/1/2025 0908 by Silvana Iyer RN)  Will report anxiety at manageable levels:   Teach and rehearse alternative coping skills   Provide emotional support with 1:1 interaction with staff  Note: Will have decreased episodes of anxiety.  4/2/2025 0109 by Becca Crowell RN  Outcome: Progressing     Problem: Behavior  Goal: Pt/Family maintain appropriate behavior and adhere to behavioral management agreement, if implemented  Description: INTERVENTIONS:  1. Assess patient/family's coping skills and  non-compliant behavior (including use of illegal substances)  2. Notify security of behavior or suspected illegal substances which indicate the need for search of the family and/or belongings  3. Encourage verbalization of thoughts and concerns in a socially appropriate manner  4. Utilize positive, consistent limit setting strategies supporting safety of patient, staff and others  5. Encourage participation in the decision making process about the behavioral management agreement  6. If a visitor's behavior poses a threat to safety call refer to organization policy.  7. Initiate consult with , Psychosocial CNS, 
  Problem: Safety - Medical Restraint  Goal: Remains free of injury from restraints (Restraint for Interference with Medical Device)  4/5/2025 0031 by Monika Tolliver RN  Outcome: Progressing. No restraint necessary thus far,    Problem: ABCDS Injury Assessment  Goal: Absence of physical injury  Outcome: Progressing     Problem: Confusion  Goal: Confusion, delirium, dementia, or psychosis is improved or at baseline  4/5/2025 0031 by Monika Tolliver RN  Outcome: Progressing  4/5/2025 0012 by Monika Tolliver RN  Outcome: Progressing  Flowsheets (Taken 4/4/2025 2026)  Effect of thought disturbance (confusion, delirium, dementia, or psychosis) are managed with adequate functional status:   Assess for contributors to thought disturbance, including medications, impaired vision or hearing, underlying metabolic abnormalities, dehydration, psychiatric diagnoses, notify LIP   Decrease environmental stimuli, including noise as appropriate   [place] high risk fall precautions, as indicated    Problem: Risk for Elopement  Goal: Patient will not exit the unit/facility without proper excort  4/5/2025 0031 by Monika Tolliver RN  Flowsheets (Taken 4/5/2025 0031)  Nursing Interventions for Elopement Risk:   Collaborate with family members/caregivers to mitigate the elopement risk   Assist with personal care needs such as toileting, eating, dressing, as needed to reduce the risk of wandering   Communicate/escalate to charge nurse the risk of elopement    Problem: Coping  Goal: Pt/Family able to verbalize concerns and demonstrate effective coping strategies  4/5/2025 0031 by Monika Tolliver RN  Outcome: Progressing  4/5/2025 0012 by Monika Tolliver RN  Outcome: Progressing  Flowsheets (Taken 4/4/2025 2026)  Patient/family able to verbalize anxieties, fears, and concerns, and demonstrate effective coping: Provide emotional support, including active listening and acknowledgement of concerns of patient and caregivers     Problem: 
  Problem: Safety - Medical Restraint  Goal: Remains free of injury from restraints (Restraint for Interference with Medical Device)  Description: INTERVENTIONS:  1. Determine that other, less restrictive measures have been tried or would not be effective before applying the restraint  2. Evaluate the patient's condition at the time of restraint application  3. Inform patient/family regarding the reason for restraint  4. Q2H: Monitor safety, psychosocial status, comfort, nutrition and hydration  Outcome: Progressing     Problem: ABCDS Injury Assessment  Goal: Absence of physical injury  Outcome: Progressing     Problem: Confusion  Goal: Confusion, delirium, dementia, or psychosis is improved or at baseline  Description: INTERVENTIONS:  1. Assess for possible contributors to thought disturbance, including medications, impaired vision or hearing, underlying metabolic abnormalities, dehydration, psychiatric diagnoses, and notify attending LIP  2. [place] high risk fall precautions, as indicated  3. Provide frequent short contacts to provide reality reorientation, refocusing and direction  4. Decrease environmental stimuli, including noise as appropriate  5. Monitor and intervene to maintain adequate nutrition, hydration, elimination, sleep and activity  6. If unable to ensure safety without constant attention obtain sitter and review sitter guidelines with assigned personnel  7. Initiate Psychosocial CNS and Spiritual Care consult, as indicated  4/1/2025 0914 by Silvana Iyer, RN  Outcome: Progressing  3/31/2025 2138 by Fransisco Rdz, RN  Outcome: Progressing     Problem: Safety - Adult  Goal: Free from fall injury  Outcome: Progressing     Problem: Risk for Elopement  Goal: Patient will not exit the unit/facility without proper excort  Outcome: Progressing     Problem: Anxiety  Goal: Will report anxiety at manageable levels  Description: INTERVENTIONS:  1. Administer medication as ordered  2. Teach and rehearse 
  Problem: Safety - Medical Restraint  Goal: Remains free of injury from restraints (Restraint for Interference with Medical Device)  Description: INTERVENTIONS:  1. Determine that other, less restrictive measures have been tried or would not be effective before applying the restraint  2. Evaluate the patient's condition at the time of restraint application  3. Inform patient/family regarding the reason for restraint  4. Q2H: Monitor safety, psychosocial status, comfort, nutrition and hydration  Outcome: Progressing     Problem: ABCDS Injury Assessment  Goal: Absence of physical injury  Outcome: Progressing     Problem: Confusion  Goal: Confusion, delirium, dementia, or psychosis is improved or at baseline  Description: INTERVENTIONS:  1. Assess for possible contributors to thought disturbance, including medications, impaired vision or hearing, underlying metabolic abnormalities, dehydration, psychiatric diagnoses, and notify attending LIP  2. [place] high risk fall precautions, as indicated  3. Provide frequent short contacts to provide reality reorientation, refocusing and direction  4. Decrease environmental stimuli, including noise as appropriate  5. Monitor and intervene to maintain adequate nutrition, hydration, elimination, sleep and activity  6. If unable to ensure safety without constant attention obtain sitter and review sitter guidelines with assigned personnel  7. Initiate Psychosocial CNS and Spiritual Care consult, as indicated  4/12/2025 2124 by Jana Sweeney RN  Outcome: Progressing     Problem: Safety - Adult  Goal: Free from fall injury  4/12/2025 2124 by Jana Sweeney RN  Outcome: Progressing     Problem: Anxiety  Goal: Will report anxiety at manageable levels  Description: INTERVENTIONS:  1. Administer medication as ordered  2. Teach and rehearse alternative coping skills  3. Provide emotional support with 1:1 interaction with staff  Outcome: Progressing     Problem: Coping  Goal: 
  Problem: Safety - Medical Restraint  Goal: Remains free of injury from restraints (Restraint for Interference with Medical Device)  Description: INTERVENTIONS:  1. Determine that other, less restrictive measures have been tried or would not be effective before applying the restraint  2. Evaluate the patient's condition at the time of restraint application  3. Inform patient/family regarding the reason for restraint  4. Q2H: Monitor safety, psychosocial status, comfort, nutrition and hydration  Outcome: Progressing     Problem: ABCDS Injury Assessment  Goal: Absence of physical injury  Outcome: Progressing     Problem: Confusion  Goal: Confusion, delirium, dementia, or psychosis is improved or at baseline  Description: INTERVENTIONS:  1. Assess for possible contributors to thought disturbance, including medications, impaired vision or hearing, underlying metabolic abnormalities, dehydration, psychiatric diagnoses, and notify attending LIP  2. [place] high risk fall precautions, as indicated  3. Provide frequent short contacts to provide reality reorientation, refocusing and direction  4. Decrease environmental stimuli, including noise as appropriate  5. Monitor and intervene to maintain adequate nutrition, hydration, elimination, sleep and activity  6. If unable to ensure safety without constant attention obtain sitter and review sitter guidelines with assigned personnel  7. Initiate Psychosocial CNS and Spiritual Care consult, as indicated  3/30/2025 2120 by Cristin Samano LPN  Outcome: Progressing     Problem: Confusion  Goal: Confusion, delirium, dementia, or psychosis is improved or at baseline  Description: INTERVENTIONS:  1. Assess for possible contributors to thought disturbance, including medications, impaired vision or hearing, underlying metabolic abnormalities, dehydration, psychiatric diagnoses, and notify attending LIP  2. [place] high risk fall precautions, as indicated  3. Provide frequent 
  Problem: Safety - Medical Restraint  Goal: Remains free of injury from restraints (Restraint for Interference with Medical Device)  Description: INTERVENTIONS:  1. Determine that other, less restrictive measures have been tried or would not be effective before applying the restraint  2. Evaluate the patient's condition at the time of restraint application  3. Inform patient/family regarding the reason for restraint  4. Q2H: Monitor safety, psychosocial status, comfort, nutrition and hydration  Outcome: Progressing     Problem: Confusion  Goal: Confusion, delirium, dementia, or psychosis is improved or at baseline  Description: INTERVENTIONS:  1. Assess for possible contributors to thought disturbance, including medications, impaired vision or hearing, underlying metabolic abnormalities, dehydration, psychiatric diagnoses, and notify attending LIP  2. [place] high risk fall precautions, as indicated  3. Provide frequent short contacts to provide reality reorientation, refocusing and direction  4. Decrease environmental stimuli, including noise as appropriate  5. Monitor and intervene to maintain adequate nutrition, hydration, elimination, sleep and activity  6. If unable to ensure safety without constant attention obtain sitter and review sitter guidelines with assigned personnel  7. Initiate Psychosocial CNS and Spiritual Care consult, as indicated  Outcome: Progressing     Problem: Safety - Adult  Goal: Free from fall injury  Outcome: Progressing     Problem: Anxiety  Goal: Will report anxiety at manageable levels  Description: INTERVENTIONS:  1. Administer medication as ordered  2. Teach and rehearse alternative coping skills  3. Provide emotional support with 1:1 interaction with staff  Outcome: Progressing     Problem: Coping  Goal: Pt/Family able to verbalize concerns and demonstrate effective coping strategies  Description: INTERVENTIONS:  1. Assist patient/family to identify coping skills, available 
  Problem: Safety - Medical Restraint  Goal: Remains free of injury from restraints (Restraint for Interference with Medical Device)  Outcome: Progressing     Problem: ABCDS Injury Assessment  Goal: Absence of physical injury  Outcome: Progressing     Problem: Confusion  Goal: Confusion, delirium, dementia, or psychosis is improved or at baseline  4/5/2025 0012 by Monika Tolliver RN  Outcome: Progressing  Flowsheets (Taken 4/4/2025 2026)  Effect of thought disturbance (confusion, delirium, dementia, or psychosis) are managed with adequate functional status:   Assess for contributors to thought disturbance, including medications, impaired vision or hearing, underlying metabolic abnormalities, dehydration, psychiatric diagnoses, notify LIP   Decrease environmental stimuli, including noise as appropriate   [place] high risk fall precautions, as indicated    
 Pt is alert to self. And is confused. Pt denies si/hi/avh. Pt does not have c/o pain at this time. Pt takes his meds whole with no issues. Pt tried to go into another pts room but was able to be verbally redirectable that it was not his room and that his room is rm 7, pt then went back into the correct room. Pt kept asking what door he could open to get out of here advised him that all the doors are locked and that he can not open them. Pt asked multiply times to how could he leave here and when can he leave I advised the pt that he would have to speak to the doctor in the morning to see when he can leave. Prn trazodone given will continue to monitor for effectiveness.     Problem: Confusion  Goal: Confusion, delirium, dementia, or psychosis is improved or at baseline  Description: INTERVENTIONS:  1. Assess for possible contributors to thought disturbance, including medications, impaired vision or hearing, underlying metabolic abnormalities, dehydration, psychiatric diagnoses, and notify attending LIP  2. [place] high risk fall precautions, as indicated  3. Provide frequent short contacts to provide reality reorientation, refocusing and direction  4. Decrease environmental stimuli, including noise as appropriate  5. Monitor and intervene to maintain adequate nutrition, hydration, elimination, sleep and activity  6. If unable to ensure safety without constant attention obtain sitter and review sitter guidelines with assigned personnel  7. Initiate Psychosocial CNS and Spiritual Care consult, as indicated  3/29/2025 2128 by Madeline Sin LPN  Outcome: Not Progressing  3/29/2025 1159 by Ramonita Avery, RN  Outcome: Progressing     Problem: Risk for Elopement  Goal: Patient will not exit the unit/facility without proper excort  3/29/2025 2128 by Madeline Sin LPN  Outcome: Not Progressing  3/29/2025 1159 by Ramonita Avery, RN  Outcome: Progressing     Problem: Sleep Disturbance  Goal: Will exhibit normal sleeping 
Assumed care @1930. Patient is confused but calm and cooperative with care, interacting appropriately with staff and peers. Spends free time in the TV area. A/O to person only, denies SI/HI/AVH, rates depression and anxiety 0/10. Continues to exhibit exit seeking behaviors and can contract for safety. Reports 8+ hours of sleep each night. Plan of care is ongoing.    Problem: Confusion  Goal: Confusion, delirium, dementia, or psychosis is improved or at baseline  Description: INTERVENTIONS:  1. Assess for possible contributors to thought disturbance, including medications, impaired vision or hearing, underlying metabolic abnormalities, dehydration, psychiatric diagnoses, and notify attending LIP  2. [place] high risk fall precautions, as indicated  3. Provide frequent short contacts to provide reality reorientation, refocusing and direction  4. Decrease environmental stimuli, including noise as appropriate  5. Monitor and intervene to maintain adequate nutrition, hydration, elimination, sleep and activity  6. If unable to ensure safety without constant attention obtain sitter and review sitter guidelines with assigned personnel  7. Initiate Psychosocial CNS and Spiritual Care consult, as indicated  4/6/2025 0129 by Fransisco Rdz, RN  Outcome: Progressing  4/5/2025 1846 by Sekou Ruby, RN  Outcome: Progressing     Problem: Depression  Goal: Will be euthymic at discharge  Description: INTERVENTIONS:  1. Administer medication as ordered  2. Provide emotional support via 1:1 interaction with staff  3. Encourage involvement in milieu/groups/activities  4. Monitor for social isolation  Outcome: Progressing     
Patient alert to self, pleasantly confused, steady gait, visible on unit, drink and snack provided. Incontinent of urine, pericare, clothes washed. Compliant with medications, went to sleep at 0330. Will monitor for safety.    Problem: Safety - Adult  Goal: Free from fall injury  Outcome: Progressing     Problem: Anxiety  Goal: Will report anxiety at manageable levels  Description: INTERVENTIONS:  1. Administer medication as ordered  2. Teach and rehearse alternative coping skills  3. Provide emotional support with 1:1 interaction with staff  Outcome: Progressing  Flowsheets (Taken 4/6/2025 1415)  Will report anxiety at manageable levels:   Administer medication as ordered   Provide emotional support with 1:1 interaction with staff     Problem: Pain  Goal: Verbalizes/displays adequate comfort level or baseline comfort level  Outcome: Progressing     Problem: Depression  Goal: Will be euthymic at discharge  Description: INTERVENTIONS:  1. Administer medication as ordered  2. Provide emotional support via 1:1 interaction with staff  3. Encourage involvement in milieu/groups/activities  4. Monitor for social isolation  Outcome: Progressing     
Patient is alert and oriented x1, self only.  Fall precautions intact. Takes medications whole with water and without issue. Answers questions appropriately and is able to voice wants and needs. Did not attend group this AM. Patient is independent and continent. Calm, friendly and cooperative with staff and care. Withdrawn to room this AM, resting in bed. No interactions with peers this AM. Denies AH/VH/SI/HI and no RTIS noted. No signs or symptoms of distress. No signs of aggression. Patient denies pain at this time. Call light within reach.      Problem: Confusion  Goal: Confusion, delirium, dementia, or psychosis is improved or at baseline  Description: INTERVENTIONS:  1. Assess for possible contributors to thought disturbance, including medications, impaired vision or hearing, underlying metabolic abnormalities, dehydration, psychiatric diagnoses, and notify attending LIP  2. [place] high risk fall precautions, as indicated  3. Provide frequent short contacts to provide reality reorientation, refocusing and direction  4. Decrease environmental stimuli, including noise as appropriate  5. Monitor and intervene to maintain adequate nutrition, hydration, elimination, sleep and activity  6. If unable to ensure safety without constant attention obtain sitter and review sitter guidelines with assigned personnel  7. Initiate Psychosocial CNS and Spiritual Care consult, as indicated  3/27/2025 1032 by Mary Caruso, RN  Outcome: Progressing  3/26/2025 2342 by Pedro Luis Salmeron, RN  Outcome: Not Progressing  Flowsheets (Taken 3/26/2025 2342)  Effect of thought disturbance (confusion, delirium, dementia, or psychosis) are managed with adequate functional status:   [place] high risk fall precautions, as indicated   Provide frequent short contacts to provide reality reorientation, refocusing and direction   Decrease environmental stimuli, including noise as appropriate     Problem: Risk for Elopement  Goal: Patient will not 
Patient is alert and oriented x1, self only.  Takes medications whole with water and without issue. Answers questions appropriately. Did not attend group. Patient is semi independent and continent. Calm, friendly and cooperative with staff and care. Is visible on unit.  Interacting with peers appropriately. Denies AH/VH/SI/HI and no RTIS noted. Eating meals in dayroom. No signs or symptoms of distress. No signs of aggression. Patient denies pain at this time. Call light within reach. Withdrawn to room after breakfast.       Problem: Confusion  Goal: Confusion, delirium, dementia, or psychosis is improved or at baseline  Description: INTERVENTIONS:  1. Assess for possible contributors to thought disturbance, including medications, impaired vision or hearing, underlying metabolic abnormalities, dehydration, psychiatric diagnoses, and notify attending LIP  2. [place] high risk fall precautions, as indicated  3. Provide frequent short contacts to provide reality reorientation, refocusing and direction  4. Decrease environmental stimuli, including noise as appropriate  5. Monitor and intervene to maintain adequate nutrition, hydration, elimination, sleep and activity  6. If unable to ensure safety without constant attention obtain sitter and review sitter guidelines with assigned personnel  7. Initiate Psychosocial CNS and Spiritual Care consult, as indicated  Outcome: Progressing     Problem: Safety - Adult  Goal: Free from fall injury  Outcome: Progressing     
Patient is alert and oriented x1, self only. Asking why he is here. States that he doesn't have dementia.  Fall precautions intact. Takes medications whole with water and without issue. Answers questions appropriately and is able to voice wants and needs. Attends groups. Patient is independent and incontinent. Washing his pants this AM in washer, gave hospital gown to wear in the meantime. Pt now wearing pull up for incontinence. Calm, friendly and cooperative with staff and care. Is visible on unit, at nurses station frequently, but not wanting anything inparticular.  Interacting with peers appropriately. Found making small jokes and goofing off with staff and other patients. Denies AH/VH/SI/HI and no RTIS noted. Eating meals in dayroom. No signs or symptoms of distress. No signs of aggression. Patient denies pain at this time. Call light within reach. Exit seeking in the afternoon.       Problem: Safety - Medical Restraint  Goal: Remains free of injury from restraints (Restraint for Interference with Medical Device)  Description: INTERVENTIONS:  1. Determine that other, less restrictive measures have been tried or would not be effective before applying the restraint  2. Evaluate the patient's condition at the time of restraint application  3. Inform patient/family regarding the reason for restraint  4. Q2H: Monitor safety, psychosocial status, comfort, nutrition and hydration  Outcome: Progressing     Problem: Risk for Elopement  Goal: Patient will not exit the unit/facility without proper excort  Recent Flowsheet Documentation  Taken 3/27/2025 3951 by Pedro Luis Salmeron, RN  Nursing Interventions for Elopement Risk:   Reduce environmental triggers   Place patient in room far away from exits and stairways     Problem: Safety - Adult  Goal: Free from fall injury  Outcome: Progressing     
Pt free from falls and/or injury.  He ambulates independently with a steady gait.  Problem: Safety - Adult  Goal: Free from fall injury  Outcome: Progressing     
Pt is pleasantly confused and cooperative with care. A/O x 3, med compliant, denies pain, SI/HI/AVH, depression, and anxiety. Pt is intrusive, often requiring redirection to maintain appropriate boundaries with staff and peers. Exhibits exit-seeking behavior. RN instructed pt to refrain from standing in front of the unit door and the nurses' station door. Pt is receptive to redirection but extremely forgetful, requiring reinforcement. Plan of care ongoing.    Problem: Confusion  Goal: Confusion, delirium, dementia, or psychosis is improved or at baseline  Description: INTERVENTIONS:  1. Assess for possible contributors to thought disturbance, including medications, impaired vision or hearing, underlying metabolic abnormalities, dehydration, psychiatric diagnoses, and notify attending LIP  2. [place] high risk fall precautions, as indicated  3. Provide frequent short contacts to provide reality reorientation, refocusing and direction  4. Decrease environmental stimuli, including noise as appropriate  5. Monitor and intervene to maintain adequate nutrition, hydration, elimination, sleep and activity  6. If unable to ensure safety without constant attention obtain sitter and review sitter guidelines with assigned personnel  7. Initiate Psychosocial CNS and Spiritual Care consult, as indicated  3/31/2025 2138 by Fransisco Rdz, RN  Outcome: Progressing  3/31/2025 1126 by Ely Casarez RN  Outcome: Not Progressing     Problem: Anxiety  Goal: Will report anxiety at manageable levels  Description: INTERVENTIONS:  1. Administer medication as ordered  2. Teach and rehearse alternative coping skills  3. Provide emotional support with 1:1 interaction with staff  3/31/2025 2138 by Fransisco Rdz, RN  Outcome: Progressing  3/31/2025 1126 by Ely Casarez RN  Outcome: Not Progressing     
Pt received walking independently in the dayroom. Talked to him and pt responded well to simple questions but noted with bouts of irrelevancies. Pt was alert and oriented to self only. As per pt, he doesn't know why he's here. The writer explained present situation but pt forgot everything in few minutes. Pt talked nicely with staff. He shows appreciation by saying thank you every time needs are attended. Offered snacks and able to consumed. The writer observed that he was having hard time to find his room. The writer reminded him frequently about his assign room and to use bathroom if needed. Pt was continent and able to use bathroom with supervision. Gave positive reinforcement regarding his behavior. Pt sat in the dayroom but refused to socialize with others and to watch TV. Offered night dose of medication and pt accepted without difficulties. Added PRN trazodone for sleep. At 2030 guided him back to his room and he was seen asleep at 2100. Needs attended. Kept monitored.   Problem: Confusion  Goal: Confusion, delirium, dementia, or psychosis is improved or at baseline  3/25/2025 2153 by Pedro Luis Salmeron, RN  Outcome: Not Progressing  Flowsheets (Taken 3/25/2025 2153)  Effect of thought disturbance (confusion, delirium, dementia, or psychosis) are managed with adequate functional status:   Assess for contributors to thought disturbance, including medications, impaired vision or hearing, underlying metabolic abnormalities, dehydration, psychiatric diagnoses, notify LIP   Monitor and intervene to maintain adequate nutrition, hydration, elimination, sleep and activity   Decrease environmental stimuli, including noise as appropriate  3/25/2025 1547 by Mitzy Carbone, RN  Outcome: Not Progressing     Problem: Sleep Disturbance  Goal: Will exhibit normal sleeping pattern  3/25/2025 1547 by Mitzy Carbone, RN  Outcome: Not Progressing       Problem: Behavior  Goal: Pt/Family maintain appropriate behavior and adhere to behavioral 
Pt seen in dayroom watching tv and socializing well. Alert and oriented to self only. Pt wanders at times but redirectable. The writer accompanied him to bathroom twice to void. Cooperative with care. Consistent redirection needed. Pt took medication without difficulties. Added PRN trazodone and atarax. Ate snacks with good appetite. Seen asleep at 2215. No signs of distress. Kept monitored.    Problem: Anxiety  Goal: Will report anxiety at manageable levels  Outcome: Progressing  Flowsheets (Taken 3/29/2025 0012)  Will report anxiety at manageable levels:   Administer medication as ordered   Provide emotional support with 1:1 interaction with staff   Teach and rehearse alternative coping skills     Problem: Behavior  Goal: Pt/Family maintain appropriate behavior and adhere to behavioral management agreement, if implemented  Outcome: Progressing  Flowsheets (Taken 3/29/2025 0012)  Patient/family maintains appropriate behavior and adheres to behavioral management agreement, if implemented:   Assess patient/family’s coping skills and  non-compliant behavior (including use of illegal substances)   Encourage verbalization of thoughts and concerns in a socially appropriate manner     Problem: Confusion  Goal: Confusion, delirium, dementia, or psychosis is improved or at baseline  Outcome: Progressing  Flowsheets (Taken 3/29/2025 0012)  Effect of thought disturbance (confusion, delirium, dementia, or psychosis) are managed with adequate functional status:   [place] high risk fall precautions, as indicated   Assess for contributors to thought disturbance, including medications, impaired vision or hearing, underlying metabolic abnormalities, dehydration, psychiatric diagnoses, notify LIP   Decrease environmental stimuli, including noise as appropriate   Monitor and intervene to maintain adequate nutrition, hydration, elimination, sleep and activity     
Pt visible in the dayroom watching Tv and socializing with others. Pt was cooperative with care, Pt at times confused and with bouts of irrelevancies but redirectable. Pt ate snacks with good appetite. Observed exit seeking and finding his room. The writer guided him to room several times and pt was able to use bathroom. Took medication willingly added trazodone and atarax for sleep and anxiety. Pt seen asleep at 2245. Kept monitored. No signs of distress.   Problem: Confusion  Goal: Confusion, delirium, dementia, or psychosis is improved or at baseline  Outcome: Progressing  Flowsheets (Taken 3/28/2025 0220)  Effect of thought disturbance (confusion, delirium, dementia, or psychosis) are managed with adequate functional status:   [place] high risk fall precautions, as indicated   Assess for contributors to thought disturbance, including medications, impaired vision or hearing, underlying metabolic abnormalities, dehydration, psychiatric diagnoses, notify LIP   Monitor and intervene to maintain adequate nutrition, hydration, elimination, sleep and activity   Decrease environmental stimuli, including noise as appropriate     Problem: Anxiety  Goal: Will report anxiety at manageable levels  Outcome: Progressing  Flowsheets  Taken 3/28/2025 0220  Will report anxiety at manageable levels:   Administer medication as ordered   Provide emotional support with 1:1 interaction with staff   Teach and rehearse alternative coping skills  Taken 3/27/2025 2342  Will report anxiety at manageable levels:   Administer medication as ordered   Teach and rehearse alternative coping skills   Provide emotional support with 1:1 interaction with staff     Problem: Behavior  Goal: Pt/Family maintain appropriate behavior and adhere to behavioral management agreement, if implemented  Outcome: Progressing  Flowsheets (Taken 3/28/2025 0220)  Patient/family maintains appropriate behavior and adheres to behavioral management agreement, if 
Pt was alert and oriented to self only. Pt responded to simple questions. Pt exit seeking and believes he needs to go down. Pt redirectable and no apparent sign of aggressive behavior. Frequent redirection done with regards of room and use of bathroom. Took medication willingly and added trazodone for sleep given at 1954. Seen asleep at 2100. No signs of distress. Needs attended. Kept monitored.   Problem: Anxiety  Goal: Will report anxiety at manageable levels  4/9/2025 2331 by Pedro Luis Salmeron, RN  Outcome: Progressing  Flowsheets  Taken 4/9/2025 2331  Will report anxiety at manageable levels:   Provide emotional support with 1:1 interaction with staff   Administer medication as ordered  Taken 4/9/2025 2100  Will report anxiety at manageable levels:   Administer medication as ordered   Teach and rehearse alternative coping skills   Provide emotional support with 1:1 interaction with staff  4/9/2025 1559 by Candice Willson RN  Outcome: Progressing     Problem: Behavior  Goal: Pt/Family maintain appropriate behavior and adhere to behavioral management agreement, if implemented  Outcome: Progressing  Flowsheets (Taken 4/9/2025 2331)  Patient/family maintains appropriate behavior and adheres to behavioral management agreement, if implemented:   Assess patient/family’s coping skills and  non-compliant behavior (including use of illegal substances)   Encourage verbalization of thoughts and concerns in a socially appropriate manner   Utilize positive, consistent limit setting strategies supporting safety of patient, staff and others     Problem: Confusion  Goal: Confusion, delirium, dementia, or psychosis is improved or at baseline  4/9/2025 2331 by Pedro Luis Salmeron, RN  Outcome: Not Progressing  Flowsheets (Taken 4/9/2025 2331)  Effect of thought disturbance (confusion, delirium, dementia, or psychosis) are managed with adequate functional status:   [place] high risk fall precautions, as indicated   Provide frequent short 
Pt was seen in the dayroom socializing with others. Pt was alert and oriented to self only. Pt talks how to process divorse. Explained present situation and pt understood. Acknowledged feelings and concerned. Offered snacks and noted with good appetite Pt appeared brighten but confused. The writer reminded him several times about his room. Pt wandered but can be redirected. Took medication willingly. Added trazodone for sleep. Cooperative with care. Pt needs frequent redirection with regards of his room and use of bathroom. Pt was independent and continent. Pt seen asleep at 2130 but was awake at 2300 appeared anxious looking for his wife and was going to other pt's room. Redirection done and was effective. PRN atarax given for anxiety. Settled back to room at 2345. Needs attended. Kept monitored.   Problem: Anxiety  Goal: Will report anxiety at manageable levels  Outcome: Progressing  Flowsheets  Taken 3/26/2025 2342  Will report anxiety at manageable levels:   Administer medication as ordered   Provide emotional support with 1:1 interaction with staff   Teach and rehearse alternative coping skills  Taken 3/26/2025 2027  Will report anxiety at manageable levels:   Administer medication as ordered   Teach and rehearse alternative coping skills   Provide emotional support with 1:1 interaction with staff     Problem: Behavior  Goal: Pt/Family maintain appropriate behavior and adhere to behavioral management agreement, if implemented  Outcome: Progressing  Flowsheets (Taken 3/26/2025 2342)  Patient/family maintains appropriate behavior and adheres to behavioral management agreement, if implemented:   Assess patient/family’s coping skills and  non-compliant behavior (including use of illegal substances)   Encourage verbalization of thoughts and concerns in a socially appropriate manner   If a patient’s behavior jeopardizes the safety of the patient, staff, or others refer to organization policy. If a visitor’s behavior 
Progressing  3/25/2025 2153 by Pedro Luis Salmeron, RN  Outcome: Not Progressing  Flowsheets (Taken 3/25/2025 2153)  Effect of thought disturbance (confusion, delirium, dementia, or psychosis) are managed with adequate functional status:   Assess for contributors to thought disturbance, including medications, impaired vision or hearing, underlying metabolic abnormalities, dehydration, psychiatric diagnoses, notify LIP   Monitor and intervene to maintain adequate nutrition, hydration, elimination, sleep and activity   Decrease environmental stimuli, including noise as appropriate     Problem: Risk for Elopement  Goal: Patient will not exit the unit/facility without proper excort  Outcome: Progressing  Flowsheets (Taken 3/25/2025 2133 by Pedro Luis Salmeron, RN)  Nursing Interventions for Elopement Risk:   Assist with personal care needs such as toileting, eating, dressing, as needed to reduce the risk of wandering   Communicate/escalate to nursing supervisor the risk of elopement   Communicate/escalate to charge nurse the risk of elopement     Problem: Depression/Self Harm  Goal: Effect of psychiatric condition will be minimized and patient will be protected from self harm  Description: INTERVENTIONS:  1. Assess impact of patient's symptoms on level of functioning, self care needs and offer support as indicated  2. Assess patient/family knowledge of depression, impact on illness and need for teaching  3. Provide emotional support, presence and reassurance  4. Assess for possible suicidal thoughts or ideation. If patient expresses suicidal thoughts or statements do not leave alone, initiate Suicide Precautions, move to a room close to the nursing station and obtain sitter  5. Initiate consults as appropriate with Mental Health Professional, Spiritual Care, Psychosocial CNS, and consider a recommendation to the LIP for a Psychiatric Consultation  Outcome: Progressing     Problem: Confusion  Goal: Confusion, delirium, dementia, or 
Psychosocial CNS, and consider a recommendation to the LIP for a Psychiatric Consultation  4/3/2025 2138 by Jana Sweeney, RN  Outcome: Progressing     Problem: Sleep Disturbance  Goal: Will exhibit normal sleeping pattern  Description: INTERVENTIONS:  1. Administer medication as ordered  2. Decrease environmental stimuli, including noise, as appropriate  3. Discourage social isolation and naps during the day  Outcome: Progressing    Patient visible in day room. Not social with others.  Pleasantly confused with care. Denies SI/HI, AVH. No response to internal stimulus. Denies pain. Medication compliant.  No issues and no concerns voiced.        
delusions are encouraging self harm or harm to others and intervene as appropriate  Outcome: Progressing

## 2025-04-16 ENCOUNTER — FOLLOWUP TELEPHONE ENCOUNTER (OUTPATIENT)
Dept: PSYCHIATRY | Age: 70
End: 2025-04-16

## 2025-04-16 LAB — LDLC SERPL-MCNC: 41 MG/DL

## 2025-04-17 NOTE — DISCHARGE SUMMARY
Discharge Summary   Admit Date: 3/24/2025   Discharge Date:  4/15/2025    Condition at DC stable  Spent over 40 minutes with patient and staff on DCplanning with more than 50 % of time spent with patient discussing care  Final Dx: axis I: Dementia with behavioral disturbance (HCC)   Axis 2: No diagnosis  Crystal 3: See Medical History    And Present on Admission:   Prediabetes   B12 deficiency   NOEMI (obstructive sleep apnea)   Mild episode of recurrent major depressive disorder   Dementia with behavioral disturbance (HCC)     Axis 4: Problems related to the social environment  Axis 5:  On Admission: 31-40 impairment in reality testing At Discharge: 41-50 serious symptoms   All conditions on Axis 1 and Axis 2 and active problems on Axis 3 were treated while patient was hospitalized. (  Active Hospital Problems    Diagnosis Date Noted    Dementia with behavioral disturbance (HCC) [F03.918] 03/23/2025    B12 deficiency [E53.8] 04/02/2024    Prediabetes [R73.03] 10/25/2022    NOEMI (obstructive sleep apnea) [G47.33] 10/04/2022    Mild episode of recurrent major depressive disorder [F33.0] 06/28/2022   )   Condition on DC  Mood and affect are stable and pt is not suicidal   VITALS:  BP (!) 143/67   Pulse 67   Temp 97.7 °F (36.5 °C) (Oral)   Resp 20   Ht 1.829 m (6' 0.01\")   Wt 88.2 kg (194 lb 7.1 oz)   SpO2 99%   BMI 26.37 kg/m²   Brief Summary Present Illness     Per Soha Castañeda  CC:  Dementia with behavioral disturbance      HPI:   Patient seen in room on Adult Behavioral Unit.   Patient is a 69 y.o. male who presented to [facility] ED on 03/23/25 per ED provider note: from  via EMS after the police were called to his residence. He lives at home with his wife. He tells me that he knows he is at the hospital he knows who he is he knows who his wife is. He is not sure why he is at the hospital. He tells me that he was just getting things cleaned up in the house because he is moving. His wife tells me that he has